# Patient Record
Sex: FEMALE | Race: WHITE | NOT HISPANIC OR LATINO | Employment: OTHER | URBAN - NONMETROPOLITAN AREA
[De-identification: names, ages, dates, MRNs, and addresses within clinical notes are randomized per-mention and may not be internally consistent; named-entity substitution may affect disease eponyms.]

---

## 2018-03-05 ENCOUNTER — TRANSCRIBE ORDERS (OUTPATIENT)
Dept: ADMINISTRATIVE | Facility: HOSPITAL | Age: 73
End: 2018-03-05

## 2018-03-05 DIAGNOSIS — Z12.39 SCREENING BREAST EXAMINATION: Primary | ICD-10-CM

## 2018-03-28 ENCOUNTER — HOSPITAL ENCOUNTER (OUTPATIENT)
Dept: MAMMOGRAPHY | Facility: HOSPITAL | Age: 73
Discharge: HOME | End: 2018-03-28
Admitting: NURSE PRACTITIONER

## 2018-03-28 DIAGNOSIS — Z12.39 SCREENING BREAST EXAMINATION: ICD-10-CM

## 2018-03-28 PROCEDURE — 77063 BREAST TOMOSYNTHESIS BI: CPT

## 2018-03-28 PROCEDURE — 77067 SCR MAMMO BI INCL CAD: CPT

## 2018-03-28 PROCEDURE — 77063 BREAST TOMOSYNTHESIS BI: CPT | Performed by: RADIOLOGY

## 2018-03-28 PROCEDURE — 77067 SCR MAMMO BI INCL CAD: CPT | Performed by: RADIOLOGY

## 2018-08-03 ENCOUNTER — TRANSCRIBE ORDERS (OUTPATIENT)
Dept: ADMINISTRATIVE | Facility: HOSPITAL | Age: 73
End: 2018-08-03

## 2018-08-03 DIAGNOSIS — G44.52 NEW DAILY PERSISTENT HEADACHE: Primary | ICD-10-CM

## 2018-08-07 ENCOUNTER — APPOINTMENT (OUTPATIENT)
Dept: MRI IMAGING | Facility: HOSPITAL | Age: 73
End: 2018-08-07

## 2018-08-11 ENCOUNTER — HOSPITAL ENCOUNTER (OUTPATIENT)
Dept: MRI IMAGING | Facility: HOSPITAL | Age: 73
Discharge: HOME | End: 2018-08-11
Admitting: NURSE PRACTITIONER

## 2018-08-11 DIAGNOSIS — G44.52 NEW DAILY PERSISTENT HEADACHE: ICD-10-CM

## 2018-08-11 PROCEDURE — 70551 MRI BRAIN STEM W/O DYE: CPT | Performed by: RADIOLOGY

## 2018-08-11 PROCEDURE — 70551 MRI BRAIN STEM W/O DYE: CPT

## 2019-06-06 ENCOUNTER — OFFICE VISIT CONVERTED (OUTPATIENT)
Dept: FAMILY MEDICINE CLINIC | Facility: CLINIC | Age: 74
End: 2019-06-06
Attending: NURSE PRACTITIONER

## 2019-06-06 ENCOUNTER — HOSPITAL ENCOUNTER (OUTPATIENT)
Dept: FAMILY MEDICINE CLINIC | Facility: CLINIC | Age: 74
Discharge: HOME | End: 2019-06-06
Attending: NURSE PRACTITIONER

## 2019-06-06 LAB
25(OH)D3 SERPL-MCNC: 20.7 NG/ML (ref 30–100)
ALBUMIN SERPL-MCNC: 4.1 G/DL (ref 3.5–5)
ALBUMIN/GLOB SERPL: 1.4 {RATIO} (ref 1.4–2.6)
ALP SERPL-CCNC: 74 U/L (ref 43–160)
ALT SERPL-CCNC: 17 U/L (ref 10–40)
ANION GAP SERPL CALC-SCNC: 13 MMOL/L (ref 8–19)
AST SERPL-CCNC: 19 U/L (ref 15–50)
BASOPHILS # BLD AUTO: 0.04 10*3/UL (ref 0–0.2)
BASOPHILS NFR BLD AUTO: 0.5 % (ref 0–3)
BILIRUB SERPL-MCNC: 0.5 MG/DL (ref 0.2–1.3)
BUN SERPL-MCNC: 20 MG/DL (ref 5–25)
BUN/CREAT SERPL: 24 {RATIO} (ref 6–20)
CALCIUM SERPL-MCNC: 10 MG/DL (ref 8.7–10.4)
CHLORIDE SERPL-SCNC: 105 MMOL/L (ref 99–111)
CHOLEST SERPL-MCNC: 140 MG/DL (ref 107–200)
CHOLEST/HDLC SERPL: 2.6 {RATIO} (ref 3–6)
CONV ABS IMM GRAN: 0.01 10*3/UL (ref 0–0.2)
CONV CO2: 27 MMOL/L (ref 22–32)
CONV IMMATURE GRAN: 0.1 % (ref 0–1.8)
CONV TOTAL PROTEIN: 7 G/DL (ref 6.3–8.2)
CREAT UR-MCNC: 0.85 MG/DL (ref 0.5–0.9)
DEPRECATED RDW RBC AUTO: 47.7 FL (ref 36.4–46.3)
EOSINOPHIL # BLD AUTO: 0.15 10*3/UL (ref 0–0.7)
EOSINOPHIL # BLD AUTO: 2 % (ref 0–7)
ERYTHROCYTE [DISTWIDTH] IN BLOOD BY AUTOMATED COUNT: 13.3 % (ref 11.7–14.4)
EST. AVERAGE GLUCOSE BLD GHB EST-MCNC: 137 MG/DL
GFR SERPLBLD BASED ON 1.73 SQ M-ARVRAT: >60 ML/MIN/{1.73_M2}
GLOBULIN UR ELPH-MCNC: 2.9 G/DL (ref 2–3.5)
GLUCOSE SERPL-MCNC: 155 MG/DL (ref 65–99)
HBA1C MFR BLD: 14.4 G/DL (ref 12–16)
HBA1C MFR BLD: 6.4 % (ref 3.5–5.7)
HCT VFR BLD AUTO: 45.5 % (ref 37–47)
HDLC SERPL-MCNC: 53 MG/DL (ref 40–60)
LDLC SERPL CALC-MCNC: 57 MG/DL (ref 70–100)
LYMPHOCYTES # BLD AUTO: 1.89 10*3/UL (ref 1–5)
MCH RBC QN AUTO: 30.8 PG (ref 27–31)
MCHC RBC AUTO-ENTMCNC: 31.6 G/DL (ref 33–37)
MCV RBC AUTO: 97.4 FL (ref 81–99)
MONOCYTES # BLD AUTO: 0.52 10*3/UL (ref 0.2–1.2)
MONOCYTES NFR BLD AUTO: 6.9 % (ref 3–10)
NEUTROPHILS # BLD AUTO: 4.91 10*3/UL (ref 2–8)
NEUTROPHILS NFR BLD AUTO: 65.4 % (ref 30–85)
NRBC CBCN: 0 % (ref 0–0.7)
OSMOLALITY SERPL CALC.SUM OF ELEC: 298 MOSM/KG (ref 273–304)
PLATELET # BLD AUTO: 277 10*3/UL (ref 130–400)
PMV BLD AUTO: 10.1 FL (ref 9.4–12.3)
POTASSIUM SERPL-SCNC: 4.4 MMOL/L (ref 3.5–5.3)
RBC # BLD AUTO: 4.67 10*6/UL (ref 4.2–5.4)
SODIUM SERPL-SCNC: 141 MMOL/L (ref 135–147)
T4 FREE SERPL-MCNC: 1.2 NG/DL (ref 0.9–1.8)
TRIGL SERPL-MCNC: 152 MG/DL (ref 40–150)
TSH SERPL-ACNC: 1.43 M[IU]/L (ref 0.27–4.2)
VARIANT LYMPHS NFR BLD MANUAL: 25.1 % (ref 20–45)
VLDLC SERPL-MCNC: 30 MG/DL (ref 5–37)
WBC # BLD AUTO: 7.52 10*3/UL (ref 4.8–10.8)

## 2019-06-10 ENCOUNTER — HOSPITAL ENCOUNTER (OUTPATIENT)
Dept: OTHER | Facility: HOSPITAL | Age: 74
Discharge: HOME | End: 2019-06-10
Attending: NURSE PRACTITIONER

## 2019-06-24 ENCOUNTER — OFFICE VISIT CONVERTED (OUTPATIENT)
Dept: PODIATRY | Facility: CLINIC | Age: 74
End: 2019-06-24
Attending: PODIATRIST

## 2019-07-31 ENCOUNTER — HOSPITAL ENCOUNTER (OUTPATIENT)
Dept: URGENT CARE | Facility: CLINIC | Age: 74
Discharge: HOME | End: 2019-07-31
Attending: FAMILY MEDICINE

## 2019-09-09 ENCOUNTER — HOSPITAL ENCOUNTER (OUTPATIENT)
Dept: FAMILY MEDICINE CLINIC | Facility: CLINIC | Age: 74
Discharge: HOME | End: 2019-09-09
Attending: NURSE PRACTITIONER

## 2019-09-09 ENCOUNTER — CONVERSION ENCOUNTER (OUTPATIENT)
Dept: FAMILY MEDICINE CLINIC | Facility: CLINIC | Age: 74
End: 2019-09-09

## 2019-09-09 ENCOUNTER — OFFICE VISIT CONVERTED (OUTPATIENT)
Dept: FAMILY MEDICINE CLINIC | Facility: CLINIC | Age: 74
End: 2019-09-09
Attending: NURSE PRACTITIONER

## 2019-09-09 LAB — 25(OH)D3 SERPL-MCNC: 29.7 NG/ML (ref 30–100)

## 2019-11-08 ENCOUNTER — HOSPITAL ENCOUNTER (OUTPATIENT)
Dept: MAMMOGRAPHY | Facility: HOSPITAL | Age: 74
Discharge: HOME | End: 2019-11-08
Attending: NURSE PRACTITIONER

## 2020-03-09 ENCOUNTER — CONVERSION ENCOUNTER (OUTPATIENT)
Dept: FAMILY MEDICINE CLINIC | Facility: CLINIC | Age: 75
End: 2020-03-09

## 2020-03-09 ENCOUNTER — OFFICE VISIT CONVERTED (OUTPATIENT)
Dept: FAMILY MEDICINE CLINIC | Facility: CLINIC | Age: 75
End: 2020-03-09
Attending: NURSE PRACTITIONER

## 2020-03-09 ENCOUNTER — HOSPITAL ENCOUNTER (OUTPATIENT)
Dept: FAMILY MEDICINE CLINIC | Facility: CLINIC | Age: 75
Discharge: HOME | End: 2020-03-09
Attending: NURSE PRACTITIONER

## 2020-03-09 LAB
ALBUMIN SERPL-MCNC: 4.3 G/DL (ref 3.5–5)
ALBUMIN/GLOB SERPL: 1.5 {RATIO} (ref 1.4–2.6)
ALP SERPL-CCNC: 82 U/L (ref 43–160)
ALT SERPL-CCNC: 28 U/L (ref 10–40)
ANION GAP SERPL CALC-SCNC: 16 MMOL/L (ref 8–19)
AST SERPL-CCNC: 22 U/L (ref 15–50)
BASOPHILS # BLD AUTO: 0.03 10*3/UL (ref 0–0.2)
BASOPHILS NFR BLD AUTO: 0.5 % (ref 0–3)
BILIRUB SERPL-MCNC: 0.75 MG/DL (ref 0.2–1.3)
BUN SERPL-MCNC: 15 MG/DL (ref 5–25)
BUN/CREAT SERPL: 17 {RATIO} (ref 6–20)
CALCIUM SERPL-MCNC: 10.5 MG/DL (ref 8.7–10.4)
CHLORIDE SERPL-SCNC: 103 MMOL/L (ref 99–111)
CHOLEST SERPL-MCNC: 140 MG/DL (ref 107–200)
CHOLEST/HDLC SERPL: 2.9 {RATIO} (ref 3–6)
CONV ABS IMM GRAN: 0.02 10*3/UL (ref 0–0.2)
CONV CO2: 25 MMOL/L (ref 22–32)
CONV IMMATURE GRAN: 0.3 % (ref 0–1.8)
CONV TOTAL PROTEIN: 7.1 G/DL (ref 6.3–8.2)
CREAT UR-MCNC: 0.89 MG/DL (ref 0.5–0.9)
DEPRECATED RDW RBC AUTO: 45.3 FL (ref 36.4–46.3)
EOSINOPHIL # BLD AUTO: 0.14 10*3/UL (ref 0–0.7)
EOSINOPHIL # BLD AUTO: 2.1 % (ref 0–7)
ERYTHROCYTE [DISTWIDTH] IN BLOOD BY AUTOMATED COUNT: 13 % (ref 11.7–14.4)
EST. AVERAGE GLUCOSE BLD GHB EST-MCNC: 189 MG/DL
GFR SERPLBLD BASED ON 1.73 SQ M-ARVRAT: >60 ML/MIN/{1.73_M2}
GLOBULIN UR ELPH-MCNC: 2.8 G/DL (ref 2–3.5)
GLUCOSE SERPL-MCNC: 171 MG/DL (ref 65–99)
HBA1C MFR BLD: 8.2 % (ref 3.5–5.7)
HCT VFR BLD AUTO: 45.4 % (ref 37–47)
HDLC SERPL-MCNC: 49 MG/DL (ref 40–60)
HGB BLD-MCNC: 14.6 G/DL (ref 12–16)
LDLC SERPL CALC-MCNC: 66 MG/DL (ref 70–100)
LYMPHOCYTES # BLD AUTO: 1.82 10*3/UL (ref 1–5)
LYMPHOCYTES NFR BLD AUTO: 27.4 % (ref 20–45)
MCH RBC QN AUTO: 30.4 PG (ref 27–31)
MCHC RBC AUTO-ENTMCNC: 32.2 G/DL (ref 33–37)
MCV RBC AUTO: 94.6 FL (ref 81–99)
MONOCYTES # BLD AUTO: 0.45 10*3/UL (ref 0.2–1.2)
MONOCYTES NFR BLD AUTO: 6.8 % (ref 3–10)
NEUTROPHILS # BLD AUTO: 4.19 10*3/UL (ref 2–8)
NEUTROPHILS NFR BLD AUTO: 62.9 % (ref 30–85)
NRBC CBCN: 0 % (ref 0–0.7)
OSMOLALITY SERPL CALC.SUM OF ELEC: 293 MOSM/KG (ref 273–304)
PLATELET # BLD AUTO: 287 10*3/UL (ref 130–400)
PMV BLD AUTO: 10.3 FL (ref 9.4–12.3)
POTASSIUM SERPL-SCNC: 4.6 MMOL/L (ref 3.5–5.3)
RBC # BLD AUTO: 4.8 10*6/UL (ref 4.2–5.4)
SODIUM SERPL-SCNC: 139 MMOL/L (ref 135–147)
T4 FREE SERPL-MCNC: 1.1 NG/DL (ref 0.9–1.8)
TRIGL SERPL-MCNC: 124 MG/DL (ref 40–150)
TSH SERPL-ACNC: 1.82 M[IU]/L (ref 0.27–4.2)
VLDLC SERPL-MCNC: 25 MG/DL (ref 5–37)
WBC # BLD AUTO: 6.65 10*3/UL (ref 4.8–10.8)

## 2020-03-10 LAB — 25(OH)D3 SERPL-MCNC: 29.7 NG/ML (ref 30–100)

## 2020-06-10 ENCOUNTER — CONVERSION ENCOUNTER (OUTPATIENT)
Dept: FAMILY MEDICINE CLINIC | Facility: CLINIC | Age: 75
End: 2020-06-10

## 2020-06-10 ENCOUNTER — OFFICE VISIT CONVERTED (OUTPATIENT)
Dept: FAMILY MEDICINE CLINIC | Facility: CLINIC | Age: 75
End: 2020-06-10
Attending: NURSE PRACTITIONER

## 2020-06-10 ENCOUNTER — HOSPITAL ENCOUNTER (OUTPATIENT)
Dept: FAMILY MEDICINE CLINIC | Facility: CLINIC | Age: 75
Discharge: HOME | End: 2020-06-10
Attending: NURSE PRACTITIONER

## 2020-06-10 LAB
ALBUMIN SERPL-MCNC: 4.2 G/DL (ref 3.5–5)
ALBUMIN/GLOB SERPL: 1.3 {RATIO} (ref 1.4–2.6)
ALP SERPL-CCNC: 77 U/L (ref 43–160)
ALT SERPL-CCNC: 18 U/L (ref 10–40)
ANION GAP SERPL CALC-SCNC: 14 MMOL/L (ref 8–19)
AST SERPL-CCNC: 20 U/L (ref 15–50)
BILIRUB SERPL-MCNC: 0.58 MG/DL (ref 0.2–1.3)
BUN SERPL-MCNC: 13 MG/DL (ref 5–25)
BUN/CREAT SERPL: 16 {RATIO} (ref 6–20)
CALCIUM SERPL-MCNC: 9.8 MG/DL (ref 8.7–10.4)
CHLORIDE SERPL-SCNC: 109 MMOL/L (ref 99–111)
CHOLEST SERPL-MCNC: 125 MG/DL (ref 107–200)
CHOLEST/HDLC SERPL: 2.7 {RATIO} (ref 3–6)
CONV CO2: 22 MMOL/L (ref 22–32)
CONV TOTAL PROTEIN: 7.4 G/DL (ref 6.3–8.2)
CREAT UR-MCNC: 0.83 MG/DL (ref 0.5–0.9)
EST. AVERAGE GLUCOSE BLD GHB EST-MCNC: 160 MG/DL
GFR SERPLBLD BASED ON 1.73 SQ M-ARVRAT: >60 ML/MIN/{1.73_M2}
GLOBULIN UR ELPH-MCNC: 3.2 G/DL (ref 2–3.5)
GLUCOSE SERPL-MCNC: 87 MG/DL (ref 65–99)
HBA1C MFR BLD: 7.2 % (ref 3.5–5.7)
HDLC SERPL-MCNC: 47 MG/DL (ref 40–60)
LDLC SERPL CALC-MCNC: 54 MG/DL (ref 70–100)
OSMOLALITY SERPL CALC.SUM OF ELEC: 291 MOSM/KG (ref 273–304)
POTASSIUM SERPL-SCNC: 4 MMOL/L (ref 3.5–5.3)
SODIUM SERPL-SCNC: 141 MMOL/L (ref 135–147)
TRIGL SERPL-MCNC: 119 MG/DL (ref 40–150)
VLDLC SERPL-MCNC: 24 MG/DL (ref 5–37)

## 2020-09-10 ENCOUNTER — OFFICE VISIT CONVERTED (OUTPATIENT)
Dept: FAMILY MEDICINE CLINIC | Facility: CLINIC | Age: 75
End: 2020-09-10
Attending: NURSE PRACTITIONER

## 2020-09-16 ENCOUNTER — HOSPITAL ENCOUNTER (OUTPATIENT)
Dept: OTHER | Facility: HOSPITAL | Age: 75
Discharge: HOME | End: 2020-09-16
Attending: NURSE PRACTITIONER

## 2020-09-16 LAB
25(OH)D3 SERPL-MCNC: 27.6 NG/ML (ref 30–100)
ALBUMIN SERPL-MCNC: 3.9 G/DL (ref 3.5–5)
ALBUMIN/GLOB SERPL: 1.3 {RATIO} (ref 1.4–2.6)
ALP SERPL-CCNC: 78 U/L (ref 43–160)
ALT SERPL-CCNC: 31 U/L (ref 10–40)
ANION GAP SERPL CALC-SCNC: 15 MMOL/L (ref 8–19)
AST SERPL-CCNC: 25 U/L (ref 15–50)
BILIRUB SERPL-MCNC: 0.69 MG/DL (ref 0.2–1.3)
BUN SERPL-MCNC: 19 MG/DL (ref 5–25)
BUN/CREAT SERPL: 18 {RATIO} (ref 6–20)
CALCIUM SERPL-MCNC: 10.2 MG/DL (ref 8.7–10.4)
CHLORIDE SERPL-SCNC: 108 MMOL/L (ref 99–111)
CHOLEST SERPL-MCNC: 142 MG/DL (ref 107–200)
CHOLEST/HDLC SERPL: 3 {RATIO} (ref 3–6)
CONV CO2: 25 MMOL/L (ref 22–32)
CONV CREATININE URINE, RANDOM: 49.7 MG/DL (ref 10–300)
CONV MICROALBUM.,U,RANDOM: <12 MG/L (ref 0–20)
CONV TOTAL PROTEIN: 6.9 G/DL (ref 6.3–8.2)
CREAT UR-MCNC: 1.03 MG/DL (ref 0.5–0.9)
EST. AVERAGE GLUCOSE BLD GHB EST-MCNC: 143 MG/DL
GFR SERPLBLD BASED ON 1.73 SQ M-ARVRAT: 53 ML/MIN/{1.73_M2}
GLOBULIN UR ELPH-MCNC: 3 G/DL (ref 2–3.5)
GLUCOSE SERPL-MCNC: 189 MG/DL (ref 65–99)
HBA1C MFR BLD: 6.6 % (ref 3.5–5.7)
HDLC SERPL-MCNC: 48 MG/DL (ref 40–60)
LDLC SERPL CALC-MCNC: 65 MG/DL (ref 70–100)
MICROALBUMIN/CREAT UR: 24.1 MG/G{CRE} (ref 0–35)
OSMOLALITY SERPL CALC.SUM OF ELEC: 303 MOSM/KG (ref 273–304)
POTASSIUM SERPL-SCNC: 4.9 MMOL/L (ref 3.5–5.3)
SODIUM SERPL-SCNC: 143 MMOL/L (ref 135–147)
T4 FREE SERPL-MCNC: 1.1 NG/DL (ref 0.9–1.8)
TRIGL SERPL-MCNC: 145 MG/DL (ref 40–150)
TSH SERPL-ACNC: 2.05 M[IU]/L (ref 0.27–4.2)
VLDLC SERPL-MCNC: 29 MG/DL (ref 5–37)

## 2020-11-12 ENCOUNTER — HOSPITAL ENCOUNTER (OUTPATIENT)
Dept: PREADMISSION TESTING | Facility: HOSPITAL | Age: 75
Discharge: HOME | End: 2020-11-12
Attending: OPHTHALMOLOGY

## 2020-11-14 LAB — SARS-COV-2 RNA SPEC QL NAA+PROBE: NOT DETECTED

## 2020-11-17 ENCOUNTER — HOSPITAL ENCOUNTER (OUTPATIENT)
Dept: PERIOP | Facility: HOSPITAL | Age: 75
Setting detail: HOSPITAL OUTPATIENT SURGERY
Discharge: HOME | End: 2020-11-17
Attending: OPHTHALMOLOGY

## 2020-11-17 LAB — GLUCOSE BLD-MCNC: 165 MG/DL (ref 65–99)

## 2020-12-09 ENCOUNTER — HOSPITAL ENCOUNTER (OUTPATIENT)
Dept: MAMMOGRAPHY | Facility: HOSPITAL | Age: 75
Discharge: HOME | End: 2020-12-09
Attending: NURSE PRACTITIONER

## 2021-02-17 DIAGNOSIS — Z23 IMMUNIZATION DUE: ICD-10-CM

## 2021-03-10 ENCOUNTER — OFFICE VISIT CONVERTED (OUTPATIENT)
Dept: FAMILY MEDICINE CLINIC | Facility: CLINIC | Age: 76
End: 2021-03-10
Attending: NURSE PRACTITIONER

## 2021-03-10 ENCOUNTER — HOSPITAL ENCOUNTER (OUTPATIENT)
Dept: FAMILY MEDICINE CLINIC | Facility: CLINIC | Age: 76
Discharge: HOME | End: 2021-03-10
Attending: NURSE PRACTITIONER

## 2021-03-10 LAB
BASOPHILS # BLD AUTO: 0.04 10*3/UL (ref 0–0.2)
BASOPHILS NFR BLD AUTO: 0.6 % (ref 0–3)
CONV ABS IMM GRAN: 0.02 10*3/UL (ref 0–0.2)
CONV IMMATURE GRAN: 0.3 % (ref 0–1.8)
DEPRECATED RDW RBC AUTO: 45 FL (ref 36.4–46.3)
EOSINOPHIL # BLD AUTO: 0.1 10*3/UL (ref 0–0.7)
EOSINOPHIL # BLD AUTO: 1.6 % (ref 0–7)
ERYTHROCYTE [DISTWIDTH] IN BLOOD BY AUTOMATED COUNT: 13.1 % (ref 11.7–14.4)
HCT VFR BLD AUTO: 50.1 % (ref 37–47)
HGB BLD-MCNC: 16 G/DL (ref 12–16)
LYMPHOCYTES # BLD AUTO: 1.84 10*3/UL (ref 1–5)
LYMPHOCYTES NFR BLD AUTO: 28.6 % (ref 20–45)
MCH RBC QN AUTO: 30 PG (ref 27–31)
MCHC RBC AUTO-ENTMCNC: 31.9 G/DL (ref 33–37)
MCV RBC AUTO: 93.8 FL (ref 81–99)
MONOCYTES # BLD AUTO: 0.52 10*3/UL (ref 0.2–1.2)
MONOCYTES NFR BLD AUTO: 8.1 % (ref 3–10)
NEUTROPHILS # BLD AUTO: 3.91 10*3/UL (ref 2–8)
NEUTROPHILS NFR BLD AUTO: 60.8 % (ref 30–85)
NRBC CBCN: 0 % (ref 0–0.7)
PLATELET # BLD AUTO: 281 10*3/UL (ref 130–400)
PMV BLD AUTO: 10.1 FL (ref 9.4–12.3)
RBC # BLD AUTO: 5.34 10*6/UL (ref 4.2–5.4)
WBC # BLD AUTO: 6.43 10*3/UL (ref 4.8–10.8)

## 2021-03-11 LAB
25(OH)D3 SERPL-MCNC: 43.8 NG/ML (ref 30–100)
ALBUMIN SERPL-MCNC: 4.1 G/DL (ref 3.5–5)
ALBUMIN/GLOB SERPL: 1.4 {RATIO} (ref 1.4–2.6)
ALP SERPL-CCNC: 76 U/L (ref 43–160)
ALT SERPL-CCNC: 20 U/L (ref 10–40)
ANION GAP SERPL CALC-SCNC: 15 MMOL/L (ref 8–19)
AST SERPL-CCNC: 22 U/L (ref 15–50)
BILIRUB SERPL-MCNC: 0.53 MG/DL (ref 0.2–1.3)
BUN SERPL-MCNC: 20 MG/DL (ref 5–25)
BUN/CREAT SERPL: 22 {RATIO} (ref 6–20)
CALCIUM SERPL-MCNC: 9.8 MG/DL (ref 8.7–10.4)
CHLORIDE SERPL-SCNC: 106 MMOL/L (ref 99–111)
CHOLEST SERPL-MCNC: 209 MG/DL (ref 107–200)
CHOLEST/HDLC SERPL: 4.2 {RATIO} (ref 3–6)
CONV CO2: 24 MMOL/L (ref 22–32)
CONV TOTAL PROTEIN: 7 G/DL (ref 6.3–8.2)
CREAT UR-MCNC: 0.91 MG/DL (ref 0.5–0.9)
EST. AVERAGE GLUCOSE BLD GHB EST-MCNC: 160 MG/DL
GFR SERPLBLD BASED ON 1.73 SQ M-ARVRAT: >60 ML/MIN/{1.73_M2}
GLOBULIN UR ELPH-MCNC: 2.9 G/DL (ref 2–3.5)
GLUCOSE SERPL-MCNC: 134 MG/DL (ref 65–99)
HBA1C MFR BLD: 7.2 % (ref 3.5–5.7)
HDLC SERPL-MCNC: 50 MG/DL (ref 40–60)
LDLC SERPL CALC-MCNC: 125 MG/DL (ref 70–100)
OSMOLALITY SERPL CALC.SUM OF ELEC: 295 MOSM/KG (ref 273–304)
POTASSIUM SERPL-SCNC: 4.7 MMOL/L (ref 3.5–5.3)
SODIUM SERPL-SCNC: 140 MMOL/L (ref 135–147)
T4 FREE SERPL-MCNC: 1.3 NG/DL (ref 0.9–1.8)
TRIGL SERPL-MCNC: 169 MG/DL (ref 40–150)
TSH SERPL-ACNC: 0.6 M[IU]/L (ref 0.27–4.2)
VLDLC SERPL-MCNC: 34 MG/DL (ref 5–37)

## 2021-05-13 NOTE — PROGRESS NOTES
Progress Note      Patient Name: Krissy Castillo   Patient ID: [id number]   Sex: Female   YOB: 1945    Primary Care Provider: Monica CARRILLO   Referring Provider: Monica CARRILLO    Visit Date: Yuridia 10, 2020    Provider: LORENA Darden   [place] [facility] [place]   Location Address: [address] [place] [address]  [id number]   Location Phone: [phone]          Chief Complaint  · 3 Month Follow up for Diabetes, HTN, Hyperlipidemia, Hypothyroidism, Vitamin D Deficiency, and Vitamin B12 deficiency      History Of Present Illness  Krissy Castillo is a 75 year old /White female who presents for evaluation and treatment of:      3 Month Follow up for Diabetes, HTN, Hyperlipidemia, Hypothyroidism, Vitamin D Deficiency, and Vitamin B12 deficiency  Last lab work done 3/2020 for Vitamin D level. Due to check Diabetes 1 Panel (Orders previously placed in chart)  Med refills needed    Pt reported the vaginal itching had improved after taking Diflucan, but will have itching off and on still since starting on Jardiance.    DM - pt hasn't been able to check bs, lost meter.     HTN - b/p elevated today.    Migraine - 2 since March, relief with Maxalt.    flu shot- deferred  Pneu-PCV13 3/2020 and P23-UKN  Colon-2016  Mammo-11/8/19       Past Medical History  Disease Name Date Onset Notes   Arthritis --  --    Constipated --  --    Diabetes mellitus type II, controlled --  --    Diverticular disease of colon --  --    Gastric ulcer --  --    Glaucoma --  --    Heel pain --  --    Hyperlipidemia --  --    Hypertension --  --    Hypothyroidism --  --    Migraine with aura --  --    Osteoporosis --  --    Screening Mammogram 11/8/19 --    Spinal stenosis --  --    Vitamin B12 Deficiency --  --    Vitamin D Deficiency --  --          Past Surgical History  Procedure Name Date Notes   Adenoidectomy --  --    Cholecstectomy --  --    Colonoscopy 2016 [facility]  [place]   D&C --  --    Hysterectomy --  --    Tonsilectomy --  --          Medication List  Name Date Started Instructions   atenolol 50 mg oral tablet 03/09/2020 TAKE 1 TABLET BY MOUTH EVERY DAY   atorvastatin 20 mg oral tablet 03/09/2020 take 1 tablet (20 mg) by oral route once daily at bedtime for 90 days   Caltrate 600 + D 600 mg (1,500 mg)-800 unit oral tablet,chewable  --    cetirizine 10 mg oral tablet 03/09/2020 take 1 tablet (10 mg) by oral route once daily   FiberCon 625 mg oral tablet  --    fluticasone propionate 50 mcg/actuation nasal spray,suspension 12/03/2019 spray 1 - 2 sprays in each nostril by intranasal route once daily   glipizide 5 mg oral tablet 03/09/2020 TAKE 1 TABLET(5 MG) BY MOUTH TWICE DAILY BEFORE MEALS   ibuprofen 600 mg oral tablet  take 1 tablet (600 mg) by oral route 3 times per day with food   Jardiance 10 mg oral tablet 03/10/2020 take 1 tablet (10 mg) by oral route once daily in the morning for 30 days   levothyroxine 50 mcg oral tablet 03/09/2020 TAKE 1 TABLET BY MOUTH EVERY DAY   losartan 100 mg oral tablet 03/09/2020 TAKE 1 TABLET BY MOUTH EVERY DAY   Maxalt 10 mg oral tablet 03/09/2020 take 1 tablet (10 mg) by oral route once, may repeat at 2 hour intervals; do not exceed 30 mg in 24 hours for 30 days   meclizine 12.5 mg oral tablet  take 2 tablets (25 mg) by oral route 2 times per day as needed   vitamin B12-folic acid 500-400 mcg oral tablet  --    Vitamin D3 50 mcg (2,000 unit) oral capsule 03/09/2020 take 1 capsule by oral route daily for 90 days   Voltaren 1 % topical gel 03/09/2020 apply 2 gram to the affected area(s) by topical route 4 times per day         Allergy List  Allergen Name Date Reaction Notes   NO KNOWN DRUG ALLERGIES --  --  --          Family Medical History  Disease Name Relative/Age Notes   Breast Neoplasm, Malignant Mother/   --    Stomach Neoplasm, Malignant  --    Stroke Mother/  Sister/   --    Heart Disease  "Aunt/  Brother/  Father/  Mother/  Sister/  Uncle/   --    Diabetes  --          Social History  Finding Status Start/Stop Quantity Notes   Alcohol Never --/-- --  --    Tobacco Never --/-- --  --          Immunizations  NameDate Admin Mfg Trade Name Lot Number Route Inj VIS Given VIS Publication   [id number] NE Not Entered  NE NE 06/06/2019    Comments:    Prevnar 1303/09/2020 WAL PREVNAR 13 [id number] LD 03/09/2020    Comments: Injection given. pt tolerated well. NDC 7205-2544-35         Review of Systems  · Constitutional  o Denies  o : fatigue, fever, weight gain, weight loss, chills  · Cardiovascular  o Denies  o : chest Pain, palpitations, edema (swelling)  · Respiratory  o Denies  o : frequent cough, shortness of breath  · Gastrointestinal  o Denies  o : nausea, vomiting, changes in bowel habits  · Genitourinary  o Admits  o : vaginal discharge  o Denies  o : dysuria, urinary frequency, urinary urgency, polyuria  · Neurologic  o Denies  o : headache, tingling or numbness, dizziness  · Endocrine  o Denies  o : polydipsia, polyphagia  · Psychiatric  o Denies  o : mood changes, memory changes, SI/HI      Vitals  Date Time BP Position Site L\R Cuff Size HR RR TEMP (F) WT  HT  BMI kg/m2 BSA m2 O2 Sat HC       03/09/2020 03:17 /65 Sitting    85 - R 12  133lbs 8oz 5'  1\" 25.22 1.61 95 %    06/10/2020 02:54 /47 Sitting    73 - R 18 98.5 126lbs 16oz 5'  1\" 24 1.57 96 %    06/10/2020 03:22 /64 Sitting                     Physical Examination  · Constitutional  o Appearance  o : well-nourished, in no acute distress  · Eyes  o Conjunctivae  o : conjunctivae normal  o Sclerae  o : sclerae white  o Pupils and Irises  o : pupils equal and round  o Eyelids/Ocular Adnexae  o : eyelid appearance normal, no exudates present  · Neck  o Inspection/Palpation  o : normal appearance, no masses or tenderness, trachea midline  o Range of Motion  o : cervical range of motion within normal limits  o Thyroid  o : " gland size normal, nontender, no nodules or masses present on palpation  · Respiratory  o Respiratory Effort  o : breathing unlabored  o Inspection of Chest  o : normal appearance  o Auscultation of Lungs  o : normal breath sounds throughout inspiration and expiration  · Cardiovascular  o Heart  o :   § Auscultation of Heart  § : regular rate and rhythm, no murmurs, gallops or rubs  o Peripheral Vascular System  o :   § Carotid Arteries  § : normal pulses bilaterally, no bruits present  § Extremities  § : no clubbing or edema  · Gastrointestinal  o Abdominal Examination  o : abdomen nontender to palpation, tone normal without rigidity or guarding, no masses present, bowel sounds present  · Skin and Subcutaneous Tissue  o General Inspection  o : no rashes or lesions present, no areas of discoloration  o Body Hair  o : hair normal for age, general body hair distribution normal for age  o Digits and Nails  o : no clubbing, cyanosis, deformities or edema present, normal appearing nails  · Neurologic  o Mental Status Examination  o :   § Orientation  § : grossly oriented to person, place and time  o Gait and Station  o : normal gait, able to stand without difficulty  · Psychiatric  o Judgement and Insight  o : judgment and insight intact  o Mood and Affect  o : mood normal, affect appropriate          Assessment  · Type 2 diabetes mellitus without complication, without long-term current use of insulin     250.00/E11.9  · Essential hypertension     401.9/I10  increase atenolol to bid and monitor hr call if lower than 60.  · Hyperlipidemia     272.4/E78.5  · Hypothyroidism     244.9/E03.9  · Vitamin D deficiency     268.9/E55.9  · Vitamin B12 deficiency     266.2/E53.8  · Vaginitis     616.10/N76.0    Problems Reconciled  Plan  · Orders  o ACO-39: Current medications updated and reviewed () - - 06/10/2020  · Medications  o Diflucan 150 mg oral tablet   SIG: take 1 tablet by oral route every 3 days as needed for 9 days    DISP: (3) tablets with 0 refills  Prescribed on 06/10/2020     o atenolol 50 mg oral tablet   SIG: take 1 tablet (50 mg) by oral route 2 times per day for 90 days   DISP: (180) Tablet with 1 refills  Adjusted on 06/10/2020     o Jardiance 10 mg oral tablet   SIG: take 1 tablet (10 mg) by oral route once daily in the morning for 90 days   DISP: (90) tablets with 1 refills  Adjusted on 06/10/2020     o Medications have been Reconciled  o Transition of Care or Provider Policy  · Instructions  o Continue blood sugar monitoring daily and record. Bring your log to office visits. Call the office for readings below 70 and above 250 or any complications.  o Daily foot care. Avoid walking barefoot. Annual Dilated Eye Exam.  o Discussed with patient blood pressure monitoring, hemoglobin A1C levels need to be below 7.0, and LDL (Lipid) goals below 70.  o Patient advised to monitor blood pressure (B/P) at home and journal readings. Patient informed that a B/P reading at home of more than 130/80 is considered hypertension. For readings greater jaas727/90 or higher patient is advised to follow up in the office with readings for management. Patient advised to limit sodium intake.  o Advised that cheeses and other sources of dairy fats, animal fats, fast food, and the extras (candy, pastries, pies, doughnuts and cookies) all contain LDL raising nutrients. Advised to increase fruits, vegetables, whole grains, and to monitor portion sizes.   o Patient was educated/instructed on their diagnosis, treatment and medications prior to discharge from the clinic today.  o Call the office with any concerns or questions.  · Disposition  o Return to Office in 3 months.            Electronically Signed by: LORENA Darden -Author on Yuridia 10, 2020 03:28:30 PM

## 2021-05-13 NOTE — PROGRESS NOTES
Progress Note      Patient Name: Krissy Castillo   Patient ID: [id number]   Sex: Female   YOB: 1945    Primary Care Provider: Monica CARRILLO   Referring Provider: Monica CARRILLO    Visit Date: September 10, 2020    Provider: LORENA Darden   Location: [facility]   Location Address: [address] [place] [address]  [id number]   Location Phone: [phone]          Chief Complaint  · 3 Month Follow up for Diabetes, HTN, Hyperlipidemia, Hypothyroidism, Vitamin D Deficiency, and Vitamin B12 deficiency      History Of Present Illness  Krissy Castillo is a 75 year old /White female who presents for evaluation and treatment of:      3 Month Follow up for Diabetes, HTN, Hyperlipidemia, Hypothyroidism, Vitamin D Deficiency, and Vitamin B12 deficiency.    Last lab work done 6/10/20.  Med Refills needed    DM - 147 this past Friday. pt reports doing well with medications.     HTN - elevated at office. pt reports b/p wnl at home. pt reports compliance with meds.     Hypothyroid - pt reports compliance with meds.             Past Medical History  Disease Name Date Onset Notes   Arthritis --  --    Constipated --  --    Diabetes mellitus type II, controlled --  --    Diverticular disease of colon --  --    Gastric ulcer --  --    Glaucoma --  --    Heel pain --  --    Hyperlipidemia --  --    Hypertension --  --    Hypothyroidism --  --    Migraine with aura --  --    Osteoporosis --  --    Screening Mammogram 11/8/19 --    Spinal stenosis --  --    Vitamin B12 Deficiency --  --    Vitamin D Deficiency --  --          Past Surgical History  Procedure Name Date Notes   Adenoidectomy --  --    Cholecstectomy --  --    Colonoscopy 2016 [facility]   D&C --  --    Hysterectomy --  --    Tonsilectomy --  --          Medication List  Name Date Started Instructions   atenolol 50 mg oral tablet 06/10/2020 take 1 tablet (50 mg) by  oral route 2 times per day for 90 days   atorvastatin 20 mg oral tablet 03/09/2020 take 1 tablet (20 mg) by oral route once daily at bedtime for 90 days   Blood Glucose Test miscellaneous strip  (OneTouch Verio Test Strips) use as directed   Caltrate 600 + D 600 mg (1,500 mg)-800 unit oral tablet,chewable  --    cetirizine 10 mg oral tablet 03/09/2020 take 1 tablet (10 mg) by oral route once daily   Diflucan 150 mg oral tablet 06/10/2020 take 1 tablet by oral route every 3 days as needed for 9 days   FiberCon 625 mg oral tablet  --    fluticasone propionate 50 mcg/actuation nasal spray,suspension 12/03/2019 spray 1 - 2 sprays in each nostril by intranasal route once daily   glipizide 5 mg oral tablet 03/09/2020 TAKE 1 TABLET(5 MG) BY MOUTH TWICE DAILY BEFORE MEALS   ibuprofen 600 mg oral tablet  take 1 tablet (600 mg) by oral route 3 times per day with food   Jardiance 10 mg oral tablet 06/10/2020 take 1 tablet (10 mg) by oral route once daily in the morning for 90 days   levothyroxine 50 mcg oral tablet 03/09/2020 TAKE 1 TABLET BY MOUTH EVERY DAY   losartan 100 mg oral tablet 03/09/2020 TAKE 1 TABLET BY MOUTH EVERY DAY   Maxalt 10 mg oral tablet 03/09/2020 take 1 tablet (10 mg) by oral route once, may repeat at 2 hour intervals; do not exceed 30 mg in 24 hours for 30 days   meclizine 12.5 mg oral tablet  take 2 tablets (25 mg) by oral route 2 times per day as needed   OneTouch Verio Flex miscellaneous misc  use as directed   vitamin B12-folic acid 500-400 mcg oral tablet  --    Vitamin D3 50 mcg (2,000 unit) oral capsule 03/09/2020 take 1 capsule by oral route daily for 90 days   Voltaren 1 % topical gel 03/09/2020 apply 2 gram to the affected area(s) by topical route 4 times per day         Allergy List  Allergen Name Date Reaction Notes   NO KNOWN DRUG ALLERGIES --  --  --          Family Medical History  Disease Name Relative/Age Notes   Breast Neoplasm, Malignant Mother/   --    Stomach Neoplasm, Malignant  --   "  Stroke Mother/  Sister/   --    Heart Disease Aunt/  Brother/  Father/  Mother/  Sister/  Uncle/   --    Diabetes  --          Social History  Finding Status Start/Stop Quantity Notes   Alcohol Never --/-- --  --    Tobacco Never --/-- --  --          Immunizations  NameDate Admin Mfg Trade Name Lot Number Route Inj VIS Given VIS Publication   [id number] NE Not Entered  NE NE 06/06/2019    Comments:    Prevnar 1303/09/2020 WAL PREVNAR 13 [id number] LD 03/09/2020    Comments: Injection given. pt tolerated well. NDC 1487-7479-94         Review of Systems  · Constitutional  o Denies  o : fatigue, fever, weight gain, weight loss, chills  · Cardiovascular  o Denies  o : chest Pain, palpitations, edema (swelling)  · Respiratory  o Denies  o : frequent cough, shortness of breath  · Gastrointestinal  o Denies  o : nausea, vomiting, changes in bowel habits  · Genitourinary  o Denies  o : dysuria, urinary frequency, urinary urgency, polyuria  · Neurologic  o Denies  o : headache, tingling or numbness, dizziness  · Endocrine  o Denies  o : polydipsia, polyphagia  · Psychiatric  o Denies  o : mood changes, memory changes, SI/HI      Vitals  Date Time BP Position Site L\R Cuff Size HR RR TEMP (F) WT  HT  BMI kg/m2 BSA m2 O2 Sat        03/09/2020 03:17 /65 Sitting    85 - R 12  133lbs 8oz 5'  1\" 25.22 1.61 95 %    06/10/2020 02:54 /47 Sitting    73 - R 18 98.5 126lbs 16oz 5'  1\" 24 1.57 96 %    09/10/2020 02:39 /58 Sitting    67 - R 12 98.1 123lbs 8oz 5'  1\" 23.33 1.55 96 %          Physical Examination  · Constitutional  o Appearance  o : well-nourished, in no acute distress  · Eyes  o Conjunctivae  o : conjunctivae normal  o Sclerae  o : sclerae white  o Pupils and Irises  o : pupils equal and round  o Eyelids/Ocular Adnexae  o : eyelid appearance normal, no exudates present  · Neck  o Inspection/Palpation  o : normal appearance, no masses or tenderness, trachea midline  o Range of Motion  o : " cervical range of motion within normal limits  o Thyroid  o : gland size normal, nontender, no nodules or masses present on palpation  · Respiratory  o Respiratory Effort  o : breathing unlabored  o Inspection of Chest  o : normal appearance  o Auscultation of Lungs  o : normal breath sounds throughout inspiration and expiration  · Cardiovascular  o Heart  o :   § Auscultation of Heart  § : regular rate and rhythm, no murmurs, gallops or rubs  o Peripheral Vascular System  o :   § Carotid Arteries  § : normal pulses bilaterally, no bruits present  § Extremities  § : no clubbing or edema  · Gastrointestinal  o Abdominal Examination  o : abdomen nontender to palpation, tone normal without rigidity or guarding, no masses present, bowel sounds present  · Skin and Subcutaneous Tissue  o General Inspection  o : no rashes or lesions present, no areas of discoloration  o Body Hair  o : hair normal for age, general body hair distribution normal for age  o Digits and Nails  o : no clubbing, cyanosis, deformities or edema present, normal appearing nails  · Neurologic  o Mental Status Examination  o :   § Orientation  § : grossly oriented to person, place and time  o Gait and Station  o : normal gait, able to stand without difficulty  · Psychiatric  o Judgement and Insight  o : judgment and insight intact  o Mood and Affect  o : mood normal, affect appropriate          Assessment  · Screening for depression     V79.0/Z13.89  · Visit for screening mammogram     V76.12/Z12.31  · Type 2 diabetes mellitus with hyperglycemia, without long-term current use of insulin       Type 2 diabetes mellitus with hyperglycemia     250.00/E11.65  · Essential hypertension     401.9/I10  · Hyperlipidemia     272.4/E78.5  · Hypothyroidism     244.9/E03.9  · Vitamin D deficiency     268.9/E55.9  · Vitamin B 12 deficiency     266.2/E53.8      Plan  · Orders  o Screening Mammography; Bilateral 3D (62022, , 79726) - V76.12/Z12.31 -  11/16/2020  o Diabetes 2 Panel (Urine Microalbumin, CMP, Lipid, A1c, ) [facility] (29998, 01538, 71836, 82592) - - 09/10/2020  o Thyroid Profile (07693, 07772, THYII) - 244.9/E03.9 - 09/10/2020  o Vitamin D Level (10367) - 268.9/E55.9 - 09/10/2020  o ACO-39: Current medications updated and reviewed () - - 09/10/2020  o ACO-18: Negative screen for clinical depression using a standardized tool () - - 09/10/2020  · Medications  o atorvastatin 20 mg oral tablet   SIG: take 1 tablet (20 mg) by oral route once daily at bedtime for 90 days   DISP: (90) tablets with 1 refills  Refilled on 09/10/2020     o cetirizine 10 mg oral tablet   SIG: take 1 tablet (10 mg) by oral route once daily   DISP: (90) tablets with 1 refills  Refilled on 09/10/2020     o glipizide 5 mg oral tablet   SIG: TAKE 1 TABLET(5 MG) BY MOUTH TWICE DAILY BEFORE MEALS   DISP: (180) Tablet with 1 refills  Refilled on 09/10/2020     o levothyroxine 50 mcg oral tablet   SIG: TAKE 1 TABLET BY MOUTH EVERY DAY   DISP: (90) Tablet with 1 refills  Refilled on 09/10/2020     o losartan 100 mg oral tablet   SIG: TAKE 1 TABLET BY MOUTH EVERY DAY   DISP: (90) Tablet with 1 refills  Refilled on 09/10/2020     o Vitamin D3 50 mcg (2,000 unit) oral capsule   SIG: take 1 capsule by oral route daily for 90 days   DISP: (90) capsules with 1 refills  Refilled on 09/10/2020     o Voltaren 1 % topical gel   SIG: apply 2 gram to the affected area(s) by topical route 4 times per day   DISP: (1) 100 gm tube with 1 refills  Refilled on 09/10/2020     o Medications have been Reconciled  o Transition of Care or Provider Policy  · Instructions  o Depression Screen completed and scanned into the EMR under the designated folder within the patient's documents.  o Today's PHQ-9 result is __0_  o Continue blood sugar monitoring daily and record. Bring your log to office visits. Call the office for readings below 70 and above 250 or any complications.  o Daily foot care. Avoid walking  barefoot. Annual Dilated Eye Exam.  o Discussed with patient blood pressure monitoring, hemoglobin A1C levels need to be below 7.0, and LDL (Lipid) goals below 70.  o Patient advised to monitor blood pressure (B/P) at home and journal readings. Patient informed that a B/P reading at home of more than 130/80 is considered hypertension. For readings greater nzcy110/90 or higher patient is advised to follow up in the office with readings for management. Patient advised to limit sodium intake.  o Advised that cheeses and other sources of dairy fats, animal fats, fast food, and the extras (candy, pastries, pies, doughnuts and cookies) all contain LDL raising nutrients. Advised to increase fruits, vegetables, whole grains, and to monitor portion sizes.   o Patient was educated/instructed on their diagnosis, treatment and medications prior to discharge from the clinic today.  o Call the office with any concerns or questions.  · Disposition  o Return to Office in 6 months.            Electronically Signed by: LORENA Darden -Author on September 10, 2020 03:00:18 PM

## 2021-05-14 VITALS
OXYGEN SATURATION: 96 % | SYSTOLIC BLOOD PRESSURE: 140 MMHG | HEIGHT: 61 IN | TEMPERATURE: 97.6 F | HEART RATE: 53 BPM | BODY MASS INDEX: 23.6 KG/M2 | RESPIRATION RATE: 12 BRPM | WEIGHT: 125 LBS | DIASTOLIC BLOOD PRESSURE: 92 MMHG

## 2021-05-14 VITALS
SYSTOLIC BLOOD PRESSURE: 153 MMHG | RESPIRATION RATE: 12 BRPM | BODY MASS INDEX: 23.32 KG/M2 | OXYGEN SATURATION: 96 % | HEART RATE: 67 BPM | HEIGHT: 61 IN | TEMPERATURE: 98.1 F | DIASTOLIC BLOOD PRESSURE: 58 MMHG | WEIGHT: 123.5 LBS

## 2021-05-14 NOTE — PROGRESS NOTES
Progress Note      Patient Name: Krissy Castillo   Patient ID: [id number]   Sex: Female   YOB: 1945    Primary Care Provider: Monica CARRILLO   Referring Provider: Monica CARRILLO    Visit Date: March 10, 2021    Provider: LORENA Darden   Location: [facility]   Location Address: [address] [place] [address]  [id number]   Location Phone: [phone]          Chief Complaint  · 6 Month Follow up for Diabetes, HTN, Hyperlipidemia, Hypothyroidism, Vitamin D Deficiency, and Vitamin B12 deficiency      History Of Present Illness  Krissy Castillo is a 75 year old /White female who presents for evaluation and treatment of:      6 Month Follow up for Diabetes, HTN, Hyperlipidemia, Hypothyroidism, Vitamin D Deficiency, and Vitamin B12 deficiency  Last lab work done 9/16/20  Med refills needed    DM - pt reports last bs reading was 135. pt reports compliance with medications. denies any low bs.    Hypothyroidism - reports compliance with meds. energy level is good.     Hyperlipidemia - denies any myalgias.       flu shot-9/2020  Pneu-PCV13 3/2020 and P23 2009  Colon-2016  Mammo-12/9/2020  cataract surgery 10.2020.       Past Medical History  Disease Name Date Onset Notes   Arthritis --  --    Constipated --  --    Diabetes mellitus type II, controlled --  --    Diverticular disease of colon --  --    Gastric ulcer --  --    Glaucoma --  --    Heel pain --  --    Hyperlipidemia --  --    Hypertension --  --    Hypothyroidism --  --    Migraine with aura --  --    Osteoporosis --  --    Screening Mammogram 11/8/19 --    Spinal stenosis --  --    Vitamin B12 Deficiency --  --    Vitamin D Deficiency --  --          Past Surgical History  Procedure Name Date Notes   Adenoidectomy --  --    Cholecstectomy --  --    Colonoscopy 2016 [facility]   D&C --  --    Hysterectomy --  --    Tonsilectomy --  --          Medication  List  Name Date Started Instructions   atenolol 50 mg oral tablet 03/10/2021 take 1 tablet (50 mg) by oral route 2 times per day for 90 days   atorvastatin 20 mg oral tablet 03/10/2021 take 1 tablet (20 mg) by oral route once daily at bedtime for 90 days   Blood Glucose Test miscellaneous strip  (OneTouch Verio Test Strips) use as directed   Caltrate 600 + D 600 mg (1,500 mg)-800 unit oral tablet,chewable  --    cetirizine 10 mg oral tablet 03/10/2021 take 1 tablet (10 mg) by oral route once daily   FiberCon 625 mg oral tablet  --    fluticasone propionate 50 mcg/actuation nasal spray,suspension 12/03/2019 spray 1 - 2 sprays in each nostril by intranasal route once daily   glipizide 5 mg oral tablet 03/10/2021 TAKE 1 TABLET(5 MG) BY MOUTH TWICE DAILY BEFORE MEALS   ibuprofen 600 mg oral tablet  take 1 tablet (600 mg) by oral route 3 times per day with food   Jardiance 10 mg oral tablet 03/10/2021 take 1 tablet (10 mg) by oral route once daily in the morning for 90 days   levothyroxine 50 mcg oral tablet 03/10/2021 TAKE 1 TABLET BY MOUTH EVERY DAY   losartan 100 mg oral tablet 03/10/2021 TAKE 1 TABLET BY MOUTH EVERY DAY   Maxalt 10 mg oral tablet 03/09/2020 take 1 tablet (10 mg) by oral route once, may repeat at 2 hour intervals; do not exceed 30 mg in 24 hours for 30 days   meclizine 12.5 mg oral tablet  take 2 tablets (25 mg) by oral route 2 times per day as needed   OneTouch Verio Flex miscellaneous misc  use as directed   vitamin B12-folic acid 500-400 mcg oral tablet  --    Vitamin D3 50 mcg (2,000 unit) oral capsule 09/10/2020 take 1 capsule by oral route daily for 90 days   Voltaren 1 % topical gel 09/10/2020 apply 2 gram to the affected area(s) by topical route 4 times per day         Allergy List  Allergen Name Date Reaction Notes   NO KNOWN DRUG ALLERGIES --  --  --          Family Medical History  Disease Name Relative/Age Notes   Breast Neoplasm, Malignant Mother/   --    Stomach Neoplasm, Malignant  --   "  Stroke Mother/  Sister/   --    Heart Disease Aunt/  Brother/  Father/  Mother/  Sister/  Uncle/   --    Diabetes  --          Social History  Finding Status Start/Stop Quantity Notes   Alcohol Never --/-- --  --    Tobacco Never --/-- --  --          Immunizations  NameDate Admin Mfg Trade Name Lot Number Route Inj VIS Given VIS Publication   [id number] NE Not Entered  NE NE 06/06/2019    Comments:    Prevnar 1303/09/2020 [facility] PREVNAR 13 [id number] IM LD 03/09/2020    Comments: Injection given. pt tolerated well. NDC 4694-5222-74         Review of Systems  · Constitutional  o Denies  o : fatigue, fever, weight gain, weight loss, chills  · Cardiovascular  o Denies  o : chest Pain, palpitations, edema (swelling)  · Respiratory  o Denies  o : frequent cough, shortness of breath  · Gastrointestinal  o Denies  o : nausea, vomiting, changes in bowel habits  · Genitourinary  o Denies  o : dysuria, urinary frequency, urinary urgency, polyuria  · Neurologic  o Denies  o : headache, tingling or numbness, dizziness  · Musculoskeletal  o Denies  o : joint pain, myalgias  · Psychiatric  o Denies  o : mood changes, memory changes, SI/HI      Vitals  Date Time BP Position Site L\R Cuff Size HR RR TEMP (F) WT  HT  BMI kg/m2 BSA m2 O2 Sat FR L/min FiO2 HC       06/10/2020 02:54 /47 Sitting    73 - R 18 98.5 126lbs 16oz 5'  1\" 24 1.57 96 %      06/10/2020 03:22 /64 Sitting                 09/10/2020 02:39 /58 Sitting    67 - R 12 98.1 123lbs 8oz 5'  1\" 23.33 1.55 96 %      03/10/2021 01:59 /92 Sitting    53 - R 12 97.6 125lbs 0oz 5'  1\" 23.62 1.56 96 %            Physical Examination  · Constitutional  o Appearance  o : well-nourished, in no acute distress  · Eyes  o Conjunctivae  o : conjunctivae normal  o Sclerae  o : sclerae white  o Pupils and Irises  o : pupils equal and round  o Eyelids/Ocular Adnexae  o : eyelid appearance normal, no exudates present  · Neck  o Inspection/Palpation  o : normal " appearance, no masses or tenderness, trachea midline  o Range of Motion  o : cervical range of motion within normal limits  o Thyroid  o : gland size normal, nontender, no nodules or masses present on palpation  · Respiratory  o Respiratory Effort  o : breathing unlabored  o Inspection of Chest  o : normal appearance  o Auscultation of Lungs  o : normal breath sounds throughout inspiration and expiration  · Cardiovascular  o Heart  o :   § Auscultation of Heart  § : regular rate and rhythm, no murmurs, gallops or rubs  o Peripheral Vascular System  o :   § Carotid Arteries  § : normal pulses bilaterally, no bruits present  § Extremities  § : no clubbing or edema  · Gastrointestinal  o Abdominal Examination  o : abdomen nontender to palpation, tone normal without rigidity or guarding, no masses present, bowel sounds present  · Skin and Subcutaneous Tissue  o General Inspection  o : no rashes or lesions present, no areas of discoloration  o Body Hair  o : hair normal for age, general body hair distribution normal for age  o Digits and Nails  o : no clubbing, cyanosis, deformities or edema present, normal appearing nails  · Neurologic  o Mental Status Examination  o :   § Orientation  § : grossly oriented to person, place and time  o Gait and Station  o : normal gait, able to stand without difficulty  · Psychiatric  o Judgement and Insight  o : judgment and insight intact  o Mood and Affect  o : mood normal, affect appropriate          Assessment  · Diabetes mellitus, type 2     250.00/E11.9  · Essential hypertension     401.9/I10  · Hyperlipidemia     272.4/E78.5  · Hypothyroidism     244.9/E03.9  · Vitamin D deficiency     268.9/E55.9  · Vitamin B12 deficiency     266.2/E53.8      Plan  · Orders  o Diabetes 1 Panel (CMP, Lipid, A1c) [facility] (55577, 80011, 33560) - 250.00/E11.9 - 03/10/2021  o CBC with Auto Diff [facility] (91758) - 250.00/E11.9 - 03/10/2021  o Thyroid Profile (29942, 33633, THYII) - 244.9/E03.9 -  03/10/2021  o Vitamin D Level (04244) - 268.9/E55.9 - 03/10/2021  o ACO - Pt declines to or was not able to provide an Advance Care Plan or name a Surrogate Decision Maker (1124F) - - 03/10/2021  o ACO-15: Pneumococcal Vaccine Administered or Previously Received [facility] (4040F) - - 03/10/2021  o ACO-14: Influenza immunization administered or previously received [facility] () - - 03/10/2021  o ACO-20: Screening Mammography documented and reviewed [facility] () - - 03/10/2021  o ACO-19: Colorectal cancer screening results documented and reviewed (3017F) - - 03/10/2021  o ACO-13: Fall Risk Screening with no falls in past year or only one fall without injury in the past year (1101F) - - 03/10/2021  o ACO-39: Current medications updated and reviewed (, 1159F) - - 03/10/2021  · Medications  o atenolol 50 mg oral tablet   SIG: take 1 tablet (50 mg) by oral route 2 times per day for 90 days   DISP: (180) Tablet with 1 refills  Refilled on 03/10/2021     o atorvastatin 20 mg oral tablet   SIG: take 1 tablet (20 mg) by oral route once daily at bedtime for 90 days   DISP: (90) Tablet with 1 refills  Refilled on 03/10/2021     o cetirizine 10 mg oral tablet   SIG: take 1 tablet (10 mg) by oral route once daily   DISP: (90) Tablet with 1 refills  Refilled on 03/10/2021     o glipizide 5 mg oral tablet   SIG: TAKE 1 TABLET(5 MG) BY MOUTH TWICE DAILY BEFORE MEALS   DISP: (180) Tablet with 1 refills  Refilled on 03/10/2021     o Jardiance 10 mg oral tablet   SIG: take 1 tablet (10 mg) by oral route once daily in the morning for 90 days   DISP: (90) Tablet with 1 refills  Refilled on 03/10/2021     o levothyroxine 50 mcg oral tablet   SIG: TAKE 1 TABLET BY MOUTH EVERY DAY   DISP: (90) Tablet with 1 refills  Refilled on 03/10/2021     o losartan 100 mg oral tablet   SIG: TAKE 1 TABLET BY MOUTH EVERY DAY   DISP: (90) Tablet with 1 refills  Refilled on 03/10/2021     o Medications have been Reconciled  o Transition of Care or Provider  Policy  · Instructions  o Continue blood sugar monitoring daily and record. Bring your log to office visits. Call the office for readings below 70 and above 250 or any complications.  o Daily foot care. Avoid walking barefoot. Annual Dilated Eye Exam.  o Discussed with patient blood pressure monitoring, hemoglobin A1C levels need to be below 7.0, and LDL (Lipid) goals below 70.  o Patient advised to monitor blood pressure (B/P) at home and journal readings. Patient informed that a B/P reading at home of more than 130/80 is considered hypertension. For readings greater pced301/90 or higher patient is advised to follow up in the office with readings for management. Patient advised to limit sodium intake.  o Advised that cheeses and other sources of dairy fats, animal fats, fast food, and the extras (candy, pastries, pies, doughnuts and cookies) all contain LDL raising nutrients. Advised to increase fruits, vegetables, whole grains, and to monitor portion sizes.   o Patient was educated/instructed on their diagnosis, treatment and medications prior to discharge from the clinic today.  o Call the office with any concerns or questions.  · Disposition  o Return to Office in 6 months.            Electronically Signed by: LORENA Darden -Author on March 10, 2021 02:24:54 PM

## 2021-05-15 VITALS
BODY MASS INDEX: 25.2 KG/M2 | OXYGEN SATURATION: 95 % | HEIGHT: 61 IN | HEART RATE: 85 BPM | DIASTOLIC BLOOD PRESSURE: 65 MMHG | RESPIRATION RATE: 12 BRPM | WEIGHT: 133.5 LBS | SYSTOLIC BLOOD PRESSURE: 135 MMHG

## 2021-05-15 VITALS
DIASTOLIC BLOOD PRESSURE: 65 MMHG | WEIGHT: 138 LBS | TEMPERATURE: 97.6 F | SYSTOLIC BLOOD PRESSURE: 142 MMHG | RESPIRATION RATE: 28 BRPM | BODY MASS INDEX: 26.06 KG/M2 | HEIGHT: 61 IN | OXYGEN SATURATION: 98 % | HEART RATE: 78 BPM

## 2021-05-15 VITALS
OXYGEN SATURATION: 98 % | WEIGHT: 139 LBS | DIASTOLIC BLOOD PRESSURE: 55 MMHG | HEIGHT: 61 IN | SYSTOLIC BLOOD PRESSURE: 155 MMHG | HEART RATE: 62 BPM | BODY MASS INDEX: 26.24 KG/M2

## 2021-05-15 VITALS
RESPIRATION RATE: 18 BRPM | BODY MASS INDEX: 23.98 KG/M2 | OXYGEN SATURATION: 96 % | SYSTOLIC BLOOD PRESSURE: 142 MMHG | DIASTOLIC BLOOD PRESSURE: 47 MMHG | DIASTOLIC BLOOD PRESSURE: 64 MMHG | HEIGHT: 61 IN | SYSTOLIC BLOOD PRESSURE: 154 MMHG | HEART RATE: 73 BPM | WEIGHT: 127 LBS | TEMPERATURE: 98.5 F

## 2021-05-15 VITALS
WEIGHT: 136 LBS | DIASTOLIC BLOOD PRESSURE: 82 MMHG | HEART RATE: 92 BPM | HEIGHT: 61 IN | SYSTOLIC BLOOD PRESSURE: 148 MMHG | OXYGEN SATURATION: 95 % | BODY MASS INDEX: 25.68 KG/M2

## 2021-09-09 ENCOUNTER — OFFICE VISIT (OUTPATIENT)
Dept: FAMILY MEDICINE CLINIC | Facility: CLINIC | Age: 76
End: 2021-09-09

## 2021-09-09 VITALS
OXYGEN SATURATION: 94 % | BODY MASS INDEX: 23.75 KG/M2 | WEIGHT: 125.8 LBS | TEMPERATURE: 97.8 F | SYSTOLIC BLOOD PRESSURE: 136 MMHG | DIASTOLIC BLOOD PRESSURE: 63 MMHG | HEART RATE: 56 BPM | HEIGHT: 61 IN | RESPIRATION RATE: 18 BRPM

## 2021-09-09 DIAGNOSIS — E06.3 HYPOTHYROIDISM DUE TO HASHIMOTO'S THYROIDITIS: ICD-10-CM

## 2021-09-09 DIAGNOSIS — Z71.85 IMMUNIZATION COUNSELING: ICD-10-CM

## 2021-09-09 DIAGNOSIS — Z23 NEED FOR SHINGLES VACCINE: ICD-10-CM

## 2021-09-09 DIAGNOSIS — Z79.4 CONTROLLED TYPE 2 DIABETES MELLITUS WITHOUT COMPLICATION, WITH LONG-TERM CURRENT USE OF INSULIN (HCC): ICD-10-CM

## 2021-09-09 DIAGNOSIS — I10 ESSENTIAL HYPERTENSION: ICD-10-CM

## 2021-09-09 DIAGNOSIS — E11.9 CONTROLLED TYPE 2 DIABETES MELLITUS WITHOUT COMPLICATION, WITH LONG-TERM CURRENT USE OF INSULIN (HCC): ICD-10-CM

## 2021-09-09 DIAGNOSIS — Z00.00 MEDICARE ANNUAL WELLNESS VISIT, SUBSEQUENT: Primary | ICD-10-CM

## 2021-09-09 DIAGNOSIS — E78.2 MIXED HYPERLIPIDEMIA: ICD-10-CM

## 2021-09-09 DIAGNOSIS — E55.9 VITAMIN D DEFICIENCY: ICD-10-CM

## 2021-09-09 DIAGNOSIS — D64.9 ANEMIA, UNSPECIFIED TYPE: ICD-10-CM

## 2021-09-09 DIAGNOSIS — E03.8 HYPOTHYROIDISM DUE TO HASHIMOTO'S THYROIDITIS: ICD-10-CM

## 2021-09-09 DIAGNOSIS — I10 BENIGN ESSENTIAL HYPERTENSION: ICD-10-CM

## 2021-09-09 PROBLEM — E78.5 HYPERLIPIDEMIA: Status: ACTIVE | Noted: 2021-09-09

## 2021-09-09 PROBLEM — K25.9 GASTRIC ULCER: Status: ACTIVE | Noted: 2021-09-09

## 2021-09-09 PROBLEM — E03.9 HYPOTHYROIDISM: Status: ACTIVE | Noted: 2021-09-09

## 2021-09-09 PROBLEM — E53.8 VITAMIN B12 DEFICIENCY: Status: ACTIVE | Noted: 2021-09-09

## 2021-09-09 PROBLEM — M79.673 HEEL PAIN: Status: ACTIVE | Noted: 2021-09-09

## 2021-09-09 PROBLEM — M81.0 OSTEOPOROSIS: Status: ACTIVE | Noted: 2021-09-09

## 2021-09-09 PROBLEM — M19.90 ARTHRITIS: Status: ACTIVE | Noted: 2021-09-09

## 2021-09-09 PROBLEM — H40.9 GLAUCOMA: Status: ACTIVE | Noted: 2021-09-09

## 2021-09-09 PROBLEM — K59.00 CONSTIPATED: Status: ACTIVE | Noted: 2021-09-09

## 2021-09-09 PROBLEM — G43.109 MIGRAINE WITH AURA: Status: ACTIVE | Noted: 2021-09-09

## 2021-09-09 PROCEDURE — 1159F MED LIST DOCD IN RCRD: CPT | Performed by: NURSE PRACTITIONER

## 2021-09-09 PROCEDURE — 96160 PT-FOCUSED HLTH RISK ASSMT: CPT | Performed by: NURSE PRACTITIONER

## 2021-09-09 PROCEDURE — 1170F FXNL STATUS ASSESSED: CPT | Performed by: NURSE PRACTITIONER

## 2021-09-09 PROCEDURE — G0439 PPPS, SUBSEQ VISIT: HCPCS | Performed by: NURSE PRACTITIONER

## 2021-09-09 RX ORDER — LOSARTAN POTASSIUM 100 MG/1
100 TABLET ORAL DAILY
COMMUNITY
Start: 2021-06-04 | End: 2021-09-09 | Stop reason: SDUPTHER

## 2021-09-09 RX ORDER — CALCIUM POLYCARBOPHIL 625 MG
TABLET ORAL
COMMUNITY

## 2021-09-09 RX ORDER — EMPAGLIFLOZIN 10 MG/1
10 TABLET, FILM COATED ORAL DAILY
COMMUNITY
Start: 2021-06-04 | End: 2021-09-09 | Stop reason: SDUPTHER

## 2021-09-09 RX ORDER — MELATONIN
1
COMMUNITY

## 2021-09-09 RX ORDER — GLIPIZIDE 5 MG/1
5 TABLET ORAL
Qty: 180 TABLET | Refills: 1 | Status: SHIPPED | OUTPATIENT
Start: 2021-09-09 | End: 2022-03-10 | Stop reason: SDUPTHER

## 2021-09-09 RX ORDER — LEVOTHYROXINE SODIUM 0.05 MG/1
50 TABLET ORAL
COMMUNITY
Start: 2021-06-04 | End: 2021-09-09 | Stop reason: SDUPTHER

## 2021-09-09 RX ORDER — ATENOLOL 50 MG/1
50 TABLET ORAL 2 TIMES DAILY
Qty: 180 TABLET | Refills: 1 | Status: SHIPPED | OUTPATIENT
Start: 2021-09-09 | End: 2022-03-10 | Stop reason: SDUPTHER

## 2021-09-09 RX ORDER — LEVOTHYROXINE SODIUM 0.05 MG/1
50 TABLET ORAL
Qty: 90 TABLET | Refills: 1 | Status: SHIPPED | OUTPATIENT
Start: 2021-09-09 | End: 2022-03-10 | Stop reason: SDUPTHER

## 2021-09-09 RX ORDER — EMPAGLIFLOZIN 10 MG/1
10 TABLET, FILM COATED ORAL DAILY
Qty: 90 TABLET | Refills: 1 | Status: SHIPPED | OUTPATIENT
Start: 2021-09-09 | End: 2022-03-10 | Stop reason: SDUPTHER

## 2021-09-09 RX ORDER — RIZATRIPTAN BENZOATE 10 MG/1
10 TABLET ORAL ONCE AS NEEDED
COMMUNITY
End: 2022-03-10 | Stop reason: SDUPTHER

## 2021-09-09 RX ORDER — LOSARTAN POTASSIUM 100 MG/1
100 TABLET ORAL DAILY
Qty: 90 TABLET | Refills: 1 | Status: SHIPPED | OUTPATIENT
Start: 2021-09-09 | End: 2022-03-10 | Stop reason: SDUPTHER

## 2021-09-09 RX ORDER — ATORVASTATIN CALCIUM 20 MG/1
20 TABLET, FILM COATED ORAL NIGHTLY
COMMUNITY
Start: 2021-06-14 | End: 2021-09-09 | Stop reason: SDUPTHER

## 2021-09-09 RX ORDER — GLIPIZIDE 5 MG/1
5 TABLET ORAL
COMMUNITY
Start: 2021-06-04 | End: 2021-09-09 | Stop reason: SDUPTHER

## 2021-09-09 RX ORDER — MECLIZINE HCL 12.5 MG/1
TABLET ORAL
COMMUNITY

## 2021-09-09 RX ORDER — IBUPROFEN 600 MG/1
TABLET ORAL
COMMUNITY

## 2021-09-09 RX ORDER — ATORVASTATIN CALCIUM 20 MG/1
20 TABLET, FILM COATED ORAL NIGHTLY
Qty: 90 TABLET | Refills: 1 | Status: SHIPPED | OUTPATIENT
Start: 2021-09-09 | End: 2022-09-12 | Stop reason: SDDI

## 2021-09-09 RX ORDER — SOY PROTEIN
POWDER (GRAM) ORAL
COMMUNITY

## 2021-09-09 RX ORDER — ATENOLOL 50 MG/1
50 TABLET ORAL 2 TIMES DAILY
COMMUNITY
Start: 2021-06-04 | End: 2021-09-09 | Stop reason: SDUPTHER

## 2021-09-09 NOTE — PATIENT INSTRUCTIONS
Advance Care Planning and Advance Directives     You make decisions on a daily basis - decisions about where you want to live, your career, your home, your life. Perhaps one of the most important decisions you face is your choice for future medical care. Take time to talk with your family and your healthcare team and start planning today.  Advance Care Planning is a process that can help you:  · Understand possible future healthcare decisions in light of your own experiences  · Reflect on those decision in light of your goals and values  · Discuss your decisions with those closest to you and the healthcare professionals that care for you  · Make a plan by creating a document that reflects your wishes    Surrogate Decision Maker  In the event of a medical emergency, which has left you unable to communicate or to make your own decisions, you would need someone to make decisions for you.  It is important to discuss your preferences for medical treatment with this person while you are in good health.     Qualities of a surrogate decision maker:  • Willing to take on this role and responsibility  • Knows what you want for future medical care  • Willing to follow your wishes even if they don't agree with them  • Able to make difficult medical decisions under stressful circumstances    Advance Directives  These are legal documents you can create that will guide your healthcare team and decision maker(s) when needed. These documents can be stored in the electronic medical record.    · Living Will - a legal document to guide your care if you have a terminal condition or a serious illness and are unable to communicate. States vary by statute in document names/types, but most forms may include one or more of the following:        -  Directions regarding life-prolonging treatments        -  Directions regarding artificially provided nutrition/hydration        -  Choosing a healthcare decision maker        -  Direction  regarding organ/tissue donation    · Durable Power of  for Healthcare - this document names an -in-fact to make medical decisions for you, but it may also allow this person to make personal and financial decisions for you. Please seek the advice of an  if you need this type of document.    **Advance Directives are not required and no one may discriminate against you if you do not sign one.    Medical Orders  Many states allow specific forms/orders signed by your physician to record your wishes for medical treatment in your current state of health. This form, signed in personal communication with your physician, addresses resuscitation and other medical interventions that you may or may not want.      For more information or to schedule a time with a [facility] Advance Care Planning Facilitator contact: [facility] or call [phone] and someone will contact you directly.    Medicare Wellness  Personal Prevention Plan of Service     Date of Office Visit:    Encounter Provider:  LORENA Darden  Place of Service:  [facility]  Patient Name: Krissy Castillo  :  1945    As part of the Medicare Wellness portion of your visit today, we are providing you with this personalized preventive plan of services (PPPS). This plan is based upon recommendations of the [place] Preventive Services Task Force (USPSTF) and the Advisory Committee on Immunization Practices (ACIP).    This lists the preventive care services that should be considered, and provides dates of when you are due. Items listed as completed are up-to-date and do not require any further intervention.    Health Maintenance   Topic Date Due   • COLORECTAL CANCER SCREENING  Never done   • COVID-19 Vaccine (1) Never done   • ZOSTER VACCINE (1 of 2) Never done   • TDAP/TD VACCINES (2 - Td or Tdap) 2019   • HEPATITIS C SCREENING  Never done   • ANNUAL WELLNESS VISIT   07/23/2021   • DIABETIC FOOT EXAM  07/23/2021   • HEMOGLOBIN A1C  09/10/2021   • URINE MICROALBUMIN  09/16/2021   • INFLUENZA VACCINE  10/01/2021   • DXA SCAN  11/08/2021   • LIPID PANEL  03/10/2022   • DIABETIC EYE EXAM  07/08/2022   • Pneumococcal Vaccine 65+ (2 of 2 - PPSV23) 03/09/2025       No orders of the defined types were placed in this encounter.      No follow-ups on file.        Diabetes Mellitus and Nutrition, Adult  When you have diabetes, or diabetes mellitus, it is very important to have healthy eating habits because your blood sugar (glucose) levels are greatly affected by what you eat and drink. Eating healthy foods in the right amounts, at about the same times every day, can help you:  · Control your blood glucose.  · Lower your risk of heart disease.  · Improve your blood pressure.  · Reach or maintain a healthy weight.  What can affect my meal plan?  Every person with diabetes is different, and each person has different needs for a meal plan. Your health care provider may recommend that you work with a dietitian to make a meal plan that is best for you. Your meal plan may vary depending on factors such as:  · The calories you need.  · The medicines you take.  · Your weight.  · Your blood glucose, blood pressure, and cholesterol levels.  · Your activity level.  · Other health conditions you have, such as heart or kidney disease.  How do carbohydrates affect me?  Carbohydrates, also called carbs, affect your blood glucose level more than any other type of food. Eating carbs naturally raises the amount of glucose in your blood. Carb counting is a method for keeping track of how many carbs you eat. Counting carbs is important to keep your blood glucose at a healthy level, especially if you use insulin or take certain oral diabetes medicines.  It is important to know how many carbs you can safely have in each meal. This is different for every person. Your dietitian can help you calculate how many  "carbs you should have at each meal and for each snack.  How does alcohol affect me?  Alcohol can cause a sudden decrease in blood glucose (hypoglycemia), especially if you use insulin or take certain oral diabetes medicines. Hypoglycemia can be a life-threatening condition. Symptoms of hypoglycemia, such as sleepiness, dizziness, and confusion, are similar to symptoms of having too much alcohol.  · Do not drink alcohol if:  ? Your health care provider tells you not to drink.  ? You are pregnant, may be pregnant, or are planning to become pregnant.  · If you drink alcohol:  ? Do not drink on an empty stomach.  ? Limit how much you use to:  § 0-1 drink a day for women.  § 0-2 drinks a day for men.  ? Be aware of how much alcohol is in your drink. In the [place] one drink equals one 12 oz bottle of beer (355 mL), one 5 oz glass of wine (148 mL), or one 1½ oz glass of hard liquor (44 mL).  ? Keep yourself hydrated with water, diet soda, or unsweetened iced tea.  § Keep in mind that regular soda, juice, and other mixers may contain a lot of sugar and must be counted as carbs.  What are tips for following this plan?    Reading food labels  · Start by checking the serving size on the \"Nutrition Facts\" label of packaged foods and drinks. The amount of calories, carbs, fats, and other nutrients listed on the label is based on one serving of the item. Many items contain more than one serving per package.  · Check the total grams (g) of carbs in one serving. You can calculate the number of servings of carbs in one serving by dividing the total carbs by 15. For example, if a food has 30 g of total carbs per serving, it would be equal to 2 servings of carbs.  · Check the number of grams (g) of saturated fats and trans fats in one serving. Choose foods that have a low amount or none of these fats.  · Check the number of milligrams (mg) of salt (sodium) in one serving. Most people should limit total sodium intake to less than 2,300 " "mg per day.  · Always check the nutrition information of foods labeled as \"low-fat\" or \"nonfat.\" These foods may be higher in added sugar or refined carbs and should be avoided.  · Talk to your dietitian to identify your daily goals for nutrients listed on the label.  Shopping  · Avoid buying canned, pre-made, or processed foods. These foods tend to be high in fat, sodium, and added sugar.  · Shop around the outside edge of the grocery store. This is where you will most often find fresh fruits and vegetables, bulk grains, fresh meats, and fresh dairy.  Cooking  · Use low-heat cooking methods, such as baking, instead of high-heat cooking methods like deep frying.  · Cook using healthy oils, such as olive, canola, or sunflower oil.  · Avoid cooking with butter, cream, or high-fat meats.  Meal planning  · Eat meals and snacks regularly, preferably at the same times every day. Avoid going long periods of time without eating.  · Eat foods that are high in fiber, such as fresh fruits, vegetables, beans, and whole grains. Talk with your dietitian about how many servings of carbs you can eat at each meal.  · Eat 4-6 oz (112-168 g) of lean protein each day, such as lean meat, chicken, fish, eggs, or tofu. One ounce (oz) of lean protein is equal to:  ? 1 oz (28 g) of meat, chicken, or fish.  ? 1 egg.  ? ¼ cup (62 g) of tofu.  · Eat some foods each day that contain healthy fats, such as avocado, nuts, seeds, and fish.  What foods should I eat?  Fruits  Berries. Apples. Oranges. Peaches. Apricots. Plums. Grapes. [place]. Papaya. Pomegranate. Kiwi. Cherries.  Vegetables  Lettuce. Spinach. Leafy greens, including kale, chard, cathy greens, and mustard greens. Beets. Cauliflower. Cabbage. Broccoli. Carrots. Green beans. Tomatoes. Peppers. Onions. Cucumbers. [place] sprouts.  Grains  Whole grains, such as whole-wheat or whole-grain bread, crackers, tortillas, cereal, and pasta. Unsweetened oatmeal. Quinoa. Brown or wild " rice.  Meats and other proteins  Seafood. Poultry without skin. Lean cuts of poultry and beef. Tofu. Nuts. Seeds.  Dairy  Low-fat or fat-free dairy products such as milk, yogurt, and cheese.  The items listed above may not be a complete list of foods and beverages you can eat. Contact a dietitian for more information.  What foods should I avoid?  Fruits  Fruits canned with syrup.  Vegetables  Canned vegetables. Frozen vegetables with butter or cream sauce.  Grains  Refined white flour and flour products such as bread, pasta, snack foods, and cereals. Avoid all processed foods.  Meats and other proteins  Fatty cuts of meat. Poultry with skin. Breaded or fried meats. Processed meat. Avoid saturated fats.  Dairy  Full-fat yogurt, cheese, or milk.  Beverages  Sweetened drinks, such as soda or iced tea.  The items listed above may not be a complete list of foods and beverages you should avoid. Contact a dietitian for more information.  Questions to ask a health care provider  · Do I need to meet with a diabetes educator?  · Do I need to meet with a dietitian?  · What number can I call if I have questions?  · When are the best times to check my blood glucose?  Where to find more information:  · American Diabetes Association: diabetes.org  · Academy of Nutrition and Dietetics: www.eatright.org  · National [place] of Diabetes and Digestive and Kidney Diseases: www.niddk.nih.gov  · Association of Diabetes Care and Education Specialists: www.diabeteseducator.org  Summary  · It is important to have healthy eating habits because your blood sugar (glucose) levels are greatly affected by what you eat and drink.  · A healthy meal plan will help you control your blood glucose and maintain a healthy lifestyle.  · Your health care provider may recommend that you work with a dietitian to make a meal plan that is best for you.  · Keep in mind that carbohydrates (carbs) and alcohol have immediate effects on your blood glucose levels.  It is important to count carbs and to use alcohol carefully.  This information is not intended to replace advice given to you by your health care provider. Make sure you discuss any questions you have with your health care provider.  Document Revised: 11/24/2020 Document Reviewed: 11/24/2020  Elsevier Patient Education © 2021 [employer]

## 2021-09-09 NOTE — PROGRESS NOTES
"Chief Complaint  Follow-up (6 Month ), Hyperlipidemia, Hypertension, Hypothyroidism, and Diabetes Annual wellness visit.      Subjective    History of Present Illness    Krissy Castillo presents for Annual Wellness Visit as well as for follow-up on chronic medical problems including diabetes, hypertension, hyperlipidemia and hypothyroidism.     /70 at home.     DM - bs was 135. Pt denies any low bs.     Hypothyroidism - energy level is good. Pt reports compliance with medication.    Migraines - pt reports has 1 migraine every 2 months, resolved with maxalt.     History of shingles outbreak x 2.         Past Medical History:   Diagnosis Date   • Arthritis    • Constipated    • Diabetes mellitus type II, controlled (CMS/[facility])    • Diverticular disease of colon    • Gastric ulcer    • Glaucoma    • Heel pain    • Hyperlipidemia    • Hypertension    • Hypothyroidism    • Migraine with aura    • Osteoporosis    • Spinal stenosis    • Vitamin B12 deficiency    • Vitamin D deficiency      Past Surgical History:   Procedure Laterality Date   • ADENOIDECTOMY     • CHOLECYSTECTOMY     • COLONOSCOPY  2016    [facility]   • DILATATION AND CURETTAGE     • HYSTERECTOMY     • HYSTERECTOMY     • TONSILLECTOMY       Family History   Problem Relation Age of Onset   • Breast cancer Mother    • Stroke Mother    • Heart disease Mother    • Heart disease Father    • Stroke Sister    • Heart disease Brother    • Heart disease Other    • Heart disease Other    • Stomach cancer Other    • Diabetes Other      Social History     Tobacco Use   • Smoking status: Never Smoker   • Smokeless tobacco: Never Used   Substance Use Topics   • Alcohol use: Never     Vitals:    09/09/21 1426   BP: 136/63   Pulse: 56   Resp: 18   Temp: 97.8 °F (36.6 °C)   TempSrc: Temporal   SpO2: 94%   Weight: 57.1 kg (125 lb 12.8 oz)   Height: 154.9 cm (61\")     Body mass index is 23.77 kg/m².    Result Review :        Subsequent " Medicare Wellness Visit    Chief Complaint   Patient presents with   • Follow-up     6 Month    • Hyperlipidemia   • Hypertension   • Hypothyroidism   • Diabetes      Subjective    History of Present Illness:  Krissy Castillo is a 76 y.o. female who presents for a Subsequent Medicare Wellness Visit.    The following portions of the patient's history were reviewed and   updated as appropriate: allergies, current medications, past family history, past medical history, past social history, past surgical history and problem list.    Compared to one year ago, the patient feels her physical   health is the same.    Compared to one year ago, the patient feels her mental   health is better.    Recent Hospitalizations:  This patient has not had a [facility] admission record on file within the last 365 days.    Current Medical Providers:  Patient Care Team:  Monica Tomlinson APRN as PCP - General (Nurse Practitioner)    Outpatient Medications Prior to Visit   Medication Sig Dispense Refill   • Calcium Carb-Cholecalciferol (Caltrate 600+D3 Soft) 600-800 MG-UNIT chewable tablet      • cholecalciferol (Vitamin D) 25 MCG (1000 UT) tablet Take 1 tablet by mouth.     • Cobalamin Combinations (Vitamin B12-Folic Acid) 500-400 MCG tablet      • ibuprofen (ADVIL,MOTRIN) 600 MG tablet ibuprofen 600 mg oral tablet take 1 tablet (600 mg) by oral route 3 times per day with food   Active     • meclizine (ANTIVERT) 12.5 MG tablet meclizine 12.5 mg oral tablet take 2 tablets (25 mg) by oral route 2 times per day as needed   Active     • rizatriptan (MAXALT) 10 MG tablet Take 10 mg by mouth 1 (One) Time As Needed for Migraine. May repeat in 2 hours if needed     • atenolol (TENORMIN) 50 MG tablet Take 50 mg by mouth 2 (two) times a day.     • atorvastatin (LIPITOR) 20 MG tablet Take 20 mg by mouth Every Night.     • glipizide (GLUCOTROL) 5 MG tablet Take 5 mg by mouth 2 (Two) Times a Day Before Meals.     • Jardiance 10 MG tablet  tablet Take 10 mg by mouth Daily.     • levothyroxine (SYNTHROID, LEVOTHROID) 50 MCG tablet Take 50 mcg by mouth Every Morning.     • losartan (COZAAR) 100 MG tablet Take 100 mg by mouth Daily.     • calcium polycarbophil (FiberCon) 625 MG tablet        No facility-administered medications prior to visit.       No opioid medication identified on active medication list. I have reviewed chart for other potential  high risk medication/s and harmful drug interactions in the elderly.          Aspirin is not on active medication list.  Aspirin use is not indicated based on review of current medical condition/s. Risk of harm outweighs potential benefits.  .    Patient Active Problem List   Diagnosis   • Heel pain   • Arthritis   • Benign essential hypertension   • Constipated   • Diverticular disease of colon   • Gastric ulcer   • Glaucoma   • Hyperlipidemia   • Hypertension   • Hypothyroidism   • Migraine with aura   • Osteoporosis   • Spinal stenosis   • Diabetes mellitus type II, controlled (CMS/[facility])   • Vitamin B12 deficiency   • Vitamin D deficiency     Advance Care Planning  Advance Directive is not on file.  ACP discussion was held with the patient during this visit. Patient has an advance directive (not in EMR), copy requested.    Review of Systems   Constitutional: Negative for fatigue and fever.   Respiratory: Negative for cough, chest tightness and shortness of breath.    Gastrointestinal: Negative for abdominal pain, constipation and diarrhea.   Genitourinary: Negative for difficulty urinating.   Musculoskeletal: Positive for arthralgias (knee pain).   Neurological: Negative for dizziness and light-headedness.   Psychiatric/Behavioral: Positive for sleep disturbance. Negative for suicidal ideas. The patient is not nervous/anxious.         Objective    Vitals:    09/09/21 1426   BP: 136/63   Pulse: 56   Resp: 18   Temp: 97.8 °F (36.6 °C)   TempSrc: Temporal   SpO2: 94%   Weight: 57.1 kg (125 lb 12.8 oz)  "  Height: 154.9 cm (61\")   PainSc: 0-No pain     BMI Readings from Last 1 Encounters:   09/09/21 23.77 kg/m²   BMI is within normal parameters. No follow-up required.    Does the patient have evidence of cognitive impairment? No    Physical Exam  Constitutional:       General: She is not in acute distress.     Appearance: Normal appearance.   Eyes:      General: Lids are normal. No scleral icterus.        Left eye: No discharge.      Conjunctiva/sclera: Conjunctivae normal.      Pupils: Pupils are equal, round, and reactive to light.   Neck:      Thyroid: No thyroid mass, thyromegaly or thyroid tenderness.      Vascular: No carotid bruit.   Cardiovascular:      Rate and Rhythm: Normal rate.      Pulses: Normal pulses.           Dorsalis pedis pulses are 2+ on the right side and 2+ on the left side.      Heart sounds: Normal heart sounds. No murmur heard.   No friction rub. No gallop.    Pulmonary:      Effort: Pulmonary effort is normal. No retractions.      Breath sounds: Normal breath sounds.   Abdominal:      General: Abdomen is flat. Bowel sounds are normal. There is no distension.      Palpations: Abdomen is soft.      Tenderness: There is no abdominal tenderness. There is no guarding.   Musculoskeletal:      Cervical back: Full passive range of motion without pain, normal range of motion and neck supple. No rigidity or tenderness.      Right lower leg: No edema.      Left lower leg: No edema.      Right foot: Normal range of motion.      Left foot: Normal range of motion.   Feet:      Right foot:      Protective Sensation: 8 sites tested. 8 sites sensed.      Skin integrity: Skin integrity normal. No ulcer or blister.      Toenail Condition: Right toenails are normal.      Left foot:      Protective Sensation: 8 sites tested. 8 sites sensed.      Skin integrity: Skin integrity normal. No ulcer or blister.      Toenail Condition: Left toenails are normal.      Comments: Diabetic Foot Exam Performed   "   Lymphadenopathy:      Cervical: No cervical adenopathy.   Skin:     General: Skin is warm and dry.      Findings: No lesion or rash.      Nails: There is no clubbing.   Neurological:      General: No focal deficit present.      Mental Status: She is alert and oriented to person, place, and time.      Coordination: Coordination is intact.      Gait: Gait is intact.   Psychiatric:         Attention and Perception: Attention normal.         Mood and Affect: Mood and affect normal.         Speech: Speech normal.         Behavior: Behavior normal. Behavior is cooperative.         Thought Content: Thought content normal.         Cognition and Memory: Cognition normal.         Judgment: Judgment normal.                 HEALTH RISK ASSESSMENT    Smoking Status:  Social History     Tobacco Use   Smoking Status Never Smoker   Smokeless Tobacco Never Used     Alcohol Consumption:  Social History     Substance and Sexual Activity   Alcohol Use Never     Fall Risk Screen:    Critical access hospital Fall Risk Assessment was completed, and patient is at LOW risk for falls.Assessment completed on:9/9/2021    Depression Screening:  PHQ-2/PHQ-9 Depression Screening 9/9/2021   Little interest or pleasure in doing things 0   Feeling down, depressed, or hopeless 0   Total Score 0       Health Habits and Functional and Cognitive Screening:  Functional & Cognitive Status 9/9/2021   Do you have difficulty preparing food and eating? No   Do you have difficulty bathing yourself, getting dressed or grooming yourself? No   Do you have difficulty using the toilet? No   Do you have difficulty moving around from place to place? No   Do you have trouble with steps or getting out of a bed or a chair? No   Current Diet Diabetic Diet   Dental Exam Not up to date   Eye Exam Up to date   Exercise (times per week) 7 times per week   Current Exercises Include Walking   Do you need help using the phone?  No   Are you deaf or do you have serious difficulty hearing?  No    Do you need help with transportation? No   Do you need help shopping? No   Do you need help preparing meals?  No   Do you need help with housework?  No   Do you need help with laundry? No   Do you need help taking your medications? No   Do you need help managing money? No   Do you ever drive or ride in a car without wearing a seat belt? No   Have you felt unusual stress, anger or loneliness in the last month? No   Who do you live with? Alone   If you need help, do you have trouble finding someone available to you? No   Have you been bothered in the last four weeks by sexual problems? No   Do you have difficulty concentrating, remembering or making decisions? No       Age-appropriate Screening Schedule:  Refer to the list below for future screening recommendations based on patient's age, sex and/or medical conditions. Orders for these recommended tests are listed in the plan section. The patient has been provided with a written plan.    Health Maintenance   Topic Date Due   • ZOSTER VACCINE (1 of 2) Never done   • TDAP/TD VACCINES (2 - Td or Tdap) 05/11/2019   • HEMOGLOBIN A1C  09/10/2021   • URINE MICROALBUMIN  09/16/2021   • INFLUENZA VACCINE  10/01/2021   • DXA SCAN  11/08/2021   • LIPID PANEL  03/10/2022   • DIABETIC EYE EXAM  07/08/2022   • DIABETIC FOOT EXAM  09/09/2022              Assessment/Plan   CMS Preventative Services Quick Reference  Risk Factors Identified During Encounter  Alcohol Misuse  Depression/Dysphoria  Fall Risk-High or Moderate    The above risks/problems have been discussed with the patient.  Follow up actions/plans if indicated are seen below in the Assessment/Plan Section.  Pertinent information has been shared with the patient in the After Visit Summary.    Diagnoses and all orders for this visit:    1. Medicare annual wellness visit, subsequent (Primary)    2. Immunization counseling    3. Benign essential hypertension    4. Mixed hyperlipidemia  -     Comprehensive Metabolic Panel;  Future  -     Lipid Panel; Future  -     atorvastatin (LIPITOR) 20 MG tablet; Take 1 tablet by mouth Every Night.  Dispense: 90 tablet; Refill: 1    5. Essential hypertension  -     atenolol (TENORMIN) 50 MG tablet; Take 1 tablet by mouth 2 (two) times a day.  Dispense: 180 tablet; Refill: 1  -     losartan (COZAAR) 100 MG tablet; Take 1 tablet by mouth Daily.  Dispense: 90 tablet; Refill: 1    6. Controlled type 2 diabetes mellitus without complication, with long-term current use of insulin (CMS/[facility])  -     Hemoglobin A1c; Future  -     Microalbumin / Creatinine Urine Ratio - Urine, Clean Catch; Future  -     TSH+Free T4; Future  -     glipizide (GLUCOTROL) 5 MG tablet; Take 1 tablet by mouth 2 (Two) Times a Day Before Meals.  Dispense: 180 tablet; Refill: 1  -     Jardiance 10 MG tablet tablet; Take 1 tablet by mouth Daily.  Dispense: 90 tablet; Refill: 1    7. Vitamin D deficiency  -     Vitamin D 25 Hydroxy; Future    8. Anemia, unspecified type  -     Vitamin B12; Future    9. Hypothyroidism due to Hashimoto's thyroiditis  -     levothyroxine (SYNTHROID, LEVOTHROID) 50 MCG tablet; Take 1 tablet by mouth Every Morning.  Dispense: 90 tablet; Refill: 1    10. Need for shingles vaccine  -     Zoster Vac Recomb Adjuvanted 50 MCG/0.5ML reconstituted suspension; Inject 0.5 mL into the appropriate muscle as directed by prescriber 1 (One) Time for 1 dose. Repeat in 1-5 months for final dose.  Dispense: 1 each; Refill: 1    Other orders  -     Cancel: Ambulatory Referral For Screening Colonoscopy (Gastroenterology)        Follow Up:   Return in about 6 months (around 3/9/2022) for Next scheduled follow up.     An After Visit Summary and PPPS were made available to the patient.    Updated annual wellness visit checklist.  Immunizations up-to-date.  Screening up-to-date or ordered.  Recommend yearly dental and eye exams. Also discussed monitoring of blood pressure and lipids.      LORENA Darden

## 2021-09-17 ENCOUNTER — LAB (OUTPATIENT)
Dept: FAMILY MEDICINE CLINIC | Facility: CLINIC | Age: 76
End: 2021-09-17

## 2021-09-17 DIAGNOSIS — E78.2 MIXED HYPERLIPIDEMIA: ICD-10-CM

## 2021-09-17 DIAGNOSIS — E11.9 CONTROLLED TYPE 2 DIABETES MELLITUS WITHOUT COMPLICATION, WITH LONG-TERM CURRENT USE OF INSULIN (HCC): ICD-10-CM

## 2021-09-17 DIAGNOSIS — Z79.4 CONTROLLED TYPE 2 DIABETES MELLITUS WITHOUT COMPLICATION, WITH LONG-TERM CURRENT USE OF INSULIN (HCC): ICD-10-CM

## 2021-09-17 DIAGNOSIS — D64.9 ANEMIA, UNSPECIFIED TYPE: ICD-10-CM

## 2021-09-17 DIAGNOSIS — E55.9 VITAMIN D DEFICIENCY: ICD-10-CM

## 2021-09-17 LAB
25(OH)D3 SERPL-MCNC: 36.7 NG/ML
ALBUMIN SERPL-MCNC: 4.2 G/DL (ref 3.5–5.2)
ALBUMIN UR-MCNC: <1.2 MG/DL
ALBUMIN/GLOB SERPL: 1.6 G/DL
ALP SERPL-CCNC: 88 U/L (ref 39–117)
ALT SERPL W P-5'-P-CCNC: 23 U/L (ref 1–33)
ANION GAP SERPL CALCULATED.3IONS-SCNC: 10.9 MMOL/L (ref 5–15)
AST SERPL-CCNC: 23 U/L (ref 1–32)
BILIRUB SERPL-MCNC: 0.5 MG/DL (ref 0–1.2)
BUN SERPL-MCNC: 17 MG/DL (ref 8–23)
BUN/CREAT SERPL: 23 (ref 7–25)
CALCIUM SPEC-SCNC: 10.1 MG/DL (ref 8.6–10.5)
CHLORIDE SERPL-SCNC: 102 MMOL/L (ref 98–107)
CHOLEST SERPL-MCNC: 137 MG/DL (ref 0–200)
CO2 SERPL-SCNC: 26.1 MMOL/L (ref 22–29)
CREAT SERPL-MCNC: 0.74 MG/DL (ref 0.57–1)
CREAT UR-MCNC: 30.4 MG/DL
GFR SERPL CREATININE-BSD FRML MDRD: 77 ML/MIN/1.73
GLOBULIN UR ELPH-MCNC: 2.7 GM/DL
GLUCOSE SERPL-MCNC: 167 MG/DL (ref 65–99)
HBA1C MFR BLD: 7.39 % (ref 4.8–5.6)
HDLC SERPL-MCNC: 46 MG/DL (ref 40–60)
LDLC SERPL CALC-MCNC: 75 MG/DL (ref 0–100)
LDLC/HDLC SERPL: 1.63 {RATIO}
MICROALBUMIN/CREAT UR: NORMAL MG/G{CREAT}
POTASSIUM SERPL-SCNC: 4.5 MMOL/L (ref 3.5–5.2)
PROT SERPL-MCNC: 6.9 G/DL (ref 6–8.5)
SODIUM SERPL-SCNC: 139 MMOL/L (ref 136–145)
T4 FREE SERPL-MCNC: 1.35 NG/DL (ref 0.93–1.7)
TRIGL SERPL-MCNC: 80 MG/DL (ref 0–150)
TSH SERPL DL<=0.05 MIU/L-ACNC: 2.48 UIU/ML (ref 0.27–4.2)
VIT B12 BLD-MCNC: >2000 PG/ML (ref 211–946)
VLDLC SERPL-MCNC: 16 MG/DL (ref 5–40)

## 2021-09-17 PROCEDURE — 82607 VITAMIN B-12: CPT | Performed by: NURSE PRACTITIONER

## 2021-09-17 PROCEDURE — 80053 COMPREHEN METABOLIC PANEL: CPT | Performed by: NURSE PRACTITIONER

## 2021-09-17 PROCEDURE — 36415 COLL VENOUS BLD VENIPUNCTURE: CPT | Performed by: NURSE PRACTITIONER

## 2021-09-17 PROCEDURE — 84439 ASSAY OF FREE THYROXINE: CPT | Performed by: NURSE PRACTITIONER

## 2021-09-17 PROCEDURE — 82043 UR ALBUMIN QUANTITATIVE: CPT | Performed by: NURSE PRACTITIONER

## 2021-09-17 PROCEDURE — 82306 VITAMIN D 25 HYDROXY: CPT | Performed by: NURSE PRACTITIONER

## 2021-09-17 PROCEDURE — 83036 HEMOGLOBIN GLYCOSYLATED A1C: CPT | Performed by: NURSE PRACTITIONER

## 2021-09-17 PROCEDURE — 84443 ASSAY THYROID STIM HORMONE: CPT | Performed by: NURSE PRACTITIONER

## 2021-09-17 PROCEDURE — 82570 ASSAY OF URINE CREATININE: CPT | Performed by: NURSE PRACTITIONER

## 2021-09-17 PROCEDURE — 80061 LIPID PANEL: CPT | Performed by: NURSE PRACTITIONER

## 2022-03-10 ENCOUNTER — OFFICE VISIT (OUTPATIENT)
Dept: FAMILY MEDICINE CLINIC | Facility: CLINIC | Age: 77
End: 2022-03-10

## 2022-03-10 VITALS
HEART RATE: 62 BPM | TEMPERATURE: 98 F | SYSTOLIC BLOOD PRESSURE: 141 MMHG | BODY MASS INDEX: 23.79 KG/M2 | HEIGHT: 61 IN | DIASTOLIC BLOOD PRESSURE: 57 MMHG | OXYGEN SATURATION: 97 % | WEIGHT: 126 LBS

## 2022-03-10 DIAGNOSIS — I10 ESSENTIAL HYPERTENSION: ICD-10-CM

## 2022-03-10 DIAGNOSIS — Z79.4 CONTROLLED TYPE 2 DIABETES MELLITUS WITHOUT COMPLICATION, WITH LONG-TERM CURRENT USE OF INSULIN: Primary | ICD-10-CM

## 2022-03-10 DIAGNOSIS — Z23 NEED FOR PNEUMOCOCCAL VACCINATION: ICD-10-CM

## 2022-03-10 DIAGNOSIS — E06.3 HYPOTHYROIDISM DUE TO HASHIMOTO'S THYROIDITIS: ICD-10-CM

## 2022-03-10 DIAGNOSIS — F51.01 PRIMARY INSOMNIA: ICD-10-CM

## 2022-03-10 DIAGNOSIS — E78.2 MIXED HYPERLIPIDEMIA: ICD-10-CM

## 2022-03-10 DIAGNOSIS — E55.9 VITAMIN D DEFICIENCY: ICD-10-CM

## 2022-03-10 DIAGNOSIS — G43.119 INTRACTABLE MIGRAINE WITH AURA WITHOUT STATUS MIGRAINOSUS: ICD-10-CM

## 2022-03-10 DIAGNOSIS — E03.8 HYPOTHYROIDISM DUE TO HASHIMOTO'S THYROIDITIS: ICD-10-CM

## 2022-03-10 DIAGNOSIS — E11.9 CONTROLLED TYPE 2 DIABETES MELLITUS WITHOUT COMPLICATION, WITH LONG-TERM CURRENT USE OF INSULIN: Primary | ICD-10-CM

## 2022-03-10 LAB
25(OH)D3 SERPL-MCNC: 33.2 NG/ML (ref 30–100)
ALBUMIN SERPL-MCNC: 4.5 G/DL (ref 3.5–5.2)
ALBUMIN/GLOB SERPL: 1.7 G/DL
ALP SERPL-CCNC: 89 U/L (ref 39–117)
ALT SERPL W P-5'-P-CCNC: 26 U/L (ref 1–33)
ANION GAP SERPL CALCULATED.3IONS-SCNC: 10.5 MMOL/L (ref 5–15)
AST SERPL-CCNC: 20 U/L (ref 1–32)
BILIRUB SERPL-MCNC: 0.7 MG/DL (ref 0–1.2)
BUN SERPL-MCNC: 13 MG/DL (ref 8–23)
BUN/CREAT SERPL: 13.7 (ref 7–25)
CALCIUM SPEC-SCNC: 10.7 MG/DL (ref 8.6–10.5)
CHLORIDE SERPL-SCNC: 104 MMOL/L (ref 98–107)
CHOLEST SERPL-MCNC: 142 MG/DL (ref 0–200)
CO2 SERPL-SCNC: 26.5 MMOL/L (ref 22–29)
CREAT SERPL-MCNC: 0.95 MG/DL (ref 0.57–1)
EGFRCR SERPLBLD CKD-EPI 2021: 62.6 ML/MIN/1.73
GLOBULIN UR ELPH-MCNC: 2.7 GM/DL
GLUCOSE SERPL-MCNC: 207 MG/DL (ref 65–99)
HBA1C MFR BLD: 7.8 % (ref 4.8–5.6)
HDLC SERPL-MCNC: 51 MG/DL (ref 40–60)
LDLC SERPL CALC-MCNC: 61 MG/DL (ref 0–100)
LDLC/HDLC SERPL: 1.07 {RATIO}
POTASSIUM SERPL-SCNC: 4.7 MMOL/L (ref 3.5–5.2)
PROT SERPL-MCNC: 7.2 G/DL (ref 6–8.5)
SODIUM SERPL-SCNC: 141 MMOL/L (ref 136–145)
TRIGL SERPL-MCNC: 181 MG/DL (ref 0–150)
TSH SERPL DL<=0.05 MIU/L-ACNC: 1.57 UIU/ML (ref 0.27–4.2)
VLDLC SERPL-MCNC: 30 MG/DL (ref 5–40)

## 2022-03-10 PROCEDURE — 84443 ASSAY THYROID STIM HORMONE: CPT | Performed by: NURSE PRACTITIONER

## 2022-03-10 PROCEDURE — 80053 COMPREHEN METABOLIC PANEL: CPT | Performed by: NURSE PRACTITIONER

## 2022-03-10 PROCEDURE — 82306 VITAMIN D 25 HYDROXY: CPT | Performed by: NURSE PRACTITIONER

## 2022-03-10 PROCEDURE — 99214 OFFICE O/P EST MOD 30 MIN: CPT | Performed by: NURSE PRACTITIONER

## 2022-03-10 PROCEDURE — 80061 LIPID PANEL: CPT | Performed by: NURSE PRACTITIONER

## 2022-03-10 PROCEDURE — 83036 HEMOGLOBIN GLYCOSYLATED A1C: CPT | Performed by: NURSE PRACTITIONER

## 2022-03-10 PROCEDURE — 36415 COLL VENOUS BLD VENIPUNCTURE: CPT | Performed by: NURSE PRACTITIONER

## 2022-03-10 RX ORDER — LEVOTHYROXINE SODIUM 0.05 MG/1
50 TABLET ORAL
Qty: 90 TABLET | Refills: 1 | Status: SHIPPED | OUTPATIENT
Start: 2022-03-10 | End: 2022-09-06 | Stop reason: SDUPTHER

## 2022-03-10 RX ORDER — GLIPIZIDE 5 MG/1
5 TABLET ORAL
Qty: 180 TABLET | Refills: 1 | Status: SHIPPED | OUTPATIENT
Start: 2022-03-10 | End: 2022-09-06 | Stop reason: SDUPTHER

## 2022-03-10 RX ORDER — LOSARTAN POTASSIUM 100 MG/1
100 TABLET ORAL DAILY
Qty: 90 TABLET | Refills: 1 | Status: SHIPPED | OUTPATIENT
Start: 2022-03-10 | End: 2022-09-06 | Stop reason: SDUPTHER

## 2022-03-10 RX ORDER — ATENOLOL 50 MG/1
50 TABLET ORAL 2 TIMES DAILY
Qty: 180 TABLET | Refills: 1 | Status: SHIPPED | OUTPATIENT
Start: 2022-03-10 | End: 2022-09-06 | Stop reason: SDUPTHER

## 2022-03-10 RX ORDER — RIZATRIPTAN BENZOATE 10 MG/1
10 TABLET ORAL ONCE AS NEEDED
Qty: 12 TABLET | Refills: 1 | Status: SHIPPED | OUTPATIENT
Start: 2022-03-10 | End: 2023-03-20 | Stop reason: SDUPTHER

## 2022-03-10 RX ORDER — EMPAGLIFLOZIN 10 MG/1
10 TABLET, FILM COATED ORAL DAILY
Qty: 90 TABLET | Refills: 1 | Status: SHIPPED | OUTPATIENT
Start: 2022-03-10 | End: 2022-09-06 | Stop reason: SDUPTHER

## 2022-03-10 RX ORDER — HYDROXYZINE HYDROCHLORIDE 25 MG/1
25 TABLET, FILM COATED ORAL NIGHTLY PRN
Qty: 30 TABLET | Refills: 1 | Status: SHIPPED | OUTPATIENT
Start: 2022-03-10 | End: 2022-06-08 | Stop reason: SDUPTHER

## 2022-03-10 RX ORDER — EMPAGLIFLOZIN 10 MG/1
TABLET, FILM COATED ORAL
COMMUNITY
Start: 2021-12-07 | End: 2022-03-10

## 2022-03-10 NOTE — PATIENT INSTRUCTIONS
Diabetes Mellitus and Nutrition, Adult  When you have diabetes, or diabetes mellitus, it is very important to have healthy eating habits because your blood sugar (glucose) levels are greatly affected by what you eat and drink. Eating healthy foods in the right amounts, at about the same times every day, can help you:  Control your blood glucose.  Lower your risk of heart disease.  Improve your blood pressure.  Reach or maintain a healthy weight.  What can affect my meal plan?  Every person with diabetes is different, and each person has different needs for a meal plan. Your health care provider may recommend that you work with a dietitian to make a meal plan that is best for you. Your meal plan may vary depending on factors such as:  The calories you need.  The medicines you take.  Your weight.  Your blood glucose, blood pressure, and cholesterol levels.  Your activity level.  Other health conditions you have, such as heart or kidney disease.  How do carbohydrates affect me?  Carbohydrates, also called carbs, affect your blood glucose level more than any other type of food. Eating carbs naturally raises the amount of glucose in your blood. Carb counting is a method for keeping track of how many carbs you eat. Counting carbs is important to keep your blood glucose at a healthy level, especially if you use insulin or take certain oral diabetes medicines.  It is important to know how many carbs you can safely have in each meal. This is different for every person. Your dietitian can help you calculate how many carbs you should have at each meal and for each snack.  How does alcohol affect me?  Alcohol can cause a sudden decrease in blood glucose (hypoglycemia), especially if you use insulin or take certain oral diabetes medicines. Hypoglycemia can be a life-threatening condition. Symptoms of hypoglycemia, such as sleepiness, dizziness, and confusion, are similar to symptoms of having too much alcohol.  Do not drink  "alcohol if:  Your health care provider tells you not to drink.  You are pregnant, may be pregnant, or are planning to become pregnant.  If you drink alcohol:  Do not drink on an empty stomach.  Limit how much you use to:  0-1 drink a day for women.  0-2 drinks a day for men.  Be aware of how much alcohol is in your drink. In the [place] one drink equals one 12 oz bottle of beer (355 mL), one 5 oz glass of wine (148 mL), or one 1½ oz glass of hard liquor (44 mL).  Keep yourself hydrated with water, diet soda, or unsweetened iced tea.  Keep in mind that regular soda, juice, and other mixers may contain a lot of sugar and must be counted as carbs.  What are tips for following this plan?    Reading food labels  Start by checking the serving size on the \"Nutrition Facts\" label of packaged foods and drinks. The amount of calories, carbs, fats, and other nutrients listed on the label is based on one serving of the item. Many items contain more than one serving per package.  Check the total grams (g) of carbs in one serving. You can calculate the number of servings of carbs in one serving by dividing the total carbs by 15. For example, if a food has 30 g of total carbs per serving, it would be equal to 2 servings of carbs.  Check the number of grams (g) of saturated fats and trans fats in one serving. Choose foods that have a low amount or none of these fats.  Check the number of milligrams (mg) of salt (sodium) in one serving. Most people should limit total sodium intake to less than 2,300 mg per day.  Always check the nutrition information of foods labeled as \"low-fat\" or \"nonfat.\" These foods may be higher in added sugar or refined carbs and should be avoided.  Talk to your dietitian to identify your daily goals for nutrients listed on the label.  Shopping  Avoid buying canned, pre-made, or processed foods. These foods tend to be high in fat, sodium, and added sugar.  Shop around the outside edge of the grocery store. This " is where you will most often find fresh fruits and vegetables, bulk grains, fresh meats, and fresh dairy.  Cooking  Use low-heat cooking methods, such as baking, instead of high-heat cooking methods like deep frying.  Cook using healthy oils, such as olive, canola, or sunflower oil.  Avoid cooking with butter, cream, or high-fat meats.  Meal planning  Eat meals and snacks regularly, preferably at the same times every day. Avoid going long periods of time without eating.  Eat foods that are high in fiber, such as fresh fruits, vegetables, beans, and whole grains. Talk with your dietitian about how many servings of carbs you can eat at each meal.  Eat 4-6 oz (112-168 g) of lean protein each day, such as lean meat, chicken, fish, eggs, or tofu. One ounce (oz) of lean protein is equal to:  1 oz (28 g) of meat, chicken, or fish.  1 egg.  ¼ cup (62 g) of tofu.  Eat some foods each day that contain healthy fats, such as avocado, nuts, seeds, and fish.  What foods should I eat?  Fruits  Berries. Apples. Oranges. Peaches. Apricots. Plums. Grapes. [place]. Papaya. Pomegranate. Kiwi. Cherries.  Vegetables  Lettuce. Spinach. Leafy greens, including kale, chard, cathy greens, and mustard greens. Beets. Cauliflower. Cabbage. Broccoli. Carrots. Green beans. Tomatoes. Peppers. Onions. Cucumbers. [place] sprouts.  Grains  Whole grains, such as whole-wheat or whole-grain bread, crackers, tortillas, cereal, and pasta. Unsweetened oatmeal. Quinoa. Brown or wild rice.  Meats and other proteins  Seafood. Poultry without skin. Lean cuts of poultry and beef. Tofu. Nuts. Seeds.  Dairy  Low-fat or fat-free dairy products such as milk, yogurt, and cheese.  The items listed above may not be a complete list of foods and beverages you can eat. Contact a dietitian for more information.  What foods should I avoid?  Fruits  Fruits canned with syrup.  Vegetables  Canned vegetables. Frozen vegetables with butter or cream sauce.  Grains  Refined  white flour and flour products such as bread, pasta, snack foods, and cereals. Avoid all processed foods.  Meats and other proteins  Fatty cuts of meat. Poultry with skin. Breaded or fried meats. Processed meat. Avoid saturated fats.  Dairy  Full-fat yogurt, cheese, or milk.  Beverages  Sweetened drinks, such as soda or iced tea.  The items listed above may not be a complete list of foods and beverages you should avoid. Contact a dietitian for more information.  Questions to ask a health care provider  Do I need to meet with a diabetes educator?  Do I need to meet with a dietitian?  What number can I call if I have questions?  When are the best times to check my blood glucose?  Where to find more information:  American Diabetes Association: diabetes.org  Academy of Nutrition and Dietetics: www.eatright.org  National [place] of Diabetes and Digestive and Kidney Diseases: www.niddk.nih.gov  Association of Diabetes Care and Education Specialists: www.diabeteseducator.org  Summary  It is important to have healthy eating habits because your blood sugar (glucose) levels are greatly affected by what you eat and drink.  A healthy meal plan will help you control your blood glucose and maintain a healthy lifestyle.  Your health care provider may recommend that you work with a dietitian to make a meal plan that is best for you.  Keep in mind that carbohydrates (carbs) and alcohol have immediate effects on your blood glucose levels. It is important to count carbs and to use alcohol carefully.  This information is not intended to replace advice given to you by your health care provider. Make sure you discuss any questions you have with your health care provider.  Document Revised: 11/24/2020 Document Reviewed: 11/24/2020  Elsevier Patient Education © 2021 [employer]

## 2022-03-10 NOTE — PROGRESS NOTES
Chief Complaint  Follow-up (6 month ), Hyperlipidemia, Hypertension, and Hypothyroidism    Subjective          Krissy Castillo is a 76 y.o. female who presents to [facility]    History of Present Illness    HTN - mild elevation. Pt reports wnl at home.     HLD - denies any myalgias.     Memory recall - pt reports she has to find names a lot of times. Pt reports daughter reports she forgets what she has told her. Pt has not missed any appts or paying bills.     Pt is not sleeping well. Pt reports she tosses and turns a lot. Pt lost her  a few years ago.     Migraine - pt reports she has had 1 migraines in the last month. Resolves with maxalt within 1 hour.     DM - pt reports compliance with med. Has not checked at home d/t glucometer needing battery.     Hypothyroid - reports compliance with med. Energy level is good.     PHQ-2 Total Score:     PHQ-9 Total Score:         Review of Systems   Constitutional: Negative for chills, fatigue and fever.   Respiratory: Negative for cough and shortness of breath.    Cardiovascular: Negative for chest pain and palpitations.   Gastrointestinal: Negative for constipation, diarrhea, nausea and vomiting.   Musculoskeletal: Negative for back pain and neck pain.   Skin: Negative for rash.   Neurological: Negative for dizziness and headaches.   Psychiatric/Behavioral:        Memory issues.           Medical History: has a past medical history of Arthritis, Constipated, Diabetes mellitus type II, controlled (HCC), Diverticular disease of colon, Gastric ulcer, Glaucoma, Heel pain, Hyperlipidemia, Hypertension, Hypothyroidism, Migraine with aura, Osteoporosis, Spinal stenosis, Vitamin B12 deficiency, and Vitamin D deficiency.     Surgical History: has a past surgical history that includes Hysterectomy; Adenoidectomy; Cholecystectomy; Colonoscopy (2016); Dilation and curettage of uterus; Hysterectomy; and Tonsillectomy.     Family History:  family history includes Breast cancer in her mother; Diabetes in an other family member; Heart disease in her brother, father, mother, and other family members; Stomach cancer in an other family member; Stroke in her mother and sister.     Social History: reports that she has never smoked. She has never used smokeless tobacco. She reports that she does not drink alcohol and does not use drugs.    Allergies: Patient has no known allergies.      Health Maintenance Due   Topic Date Due   • COLORECTAL CANCER SCREENING  Never done   • ZOSTER VACCINE (1 of 2) Never done   • Pneumococcal Vaccine 65+ (2 of 2 - PPSV23) 03/09/2021   • HEPATITIS C SCREENING  Never done            Current Outpatient Medications:   •  atenolol (TENORMIN) 50 MG tablet, Take 1 tablet by mouth 2 (Two) Times a Day., Disp: 180 tablet, Rfl: 1  •  atorvastatin (LIPITOR) 20 MG tablet, Take 1 tablet by mouth Every Night., Disp: 90 tablet, Rfl: 1  •  Calcium Carb-Cholecalciferol (Caltrate 600+D3 Soft) 600-800 MG-UNIT chewable tablet, , Disp: , Rfl:   •  calcium polycarbophil (FiberCon) 625 MG tablet, , Disp: , Rfl:   •  cholecalciferol (VITAMIN D3) 25 MCG (1000 UT) tablet, Take 1 tablet by mouth., Disp: , Rfl:   •  Cobalamin Combinations (Vitamin B12-Folic Acid) 500-400 MCG tablet, , Disp: , Rfl:   •  glipizide (GLUCOTROL) 5 MG tablet, Take 1 tablet by mouth 2 (Two) Times a Day Before Meals., Disp: 180 tablet, Rfl: 1  •  ibuprofen (ADVIL,MOTRIN) 600 MG tablet, ibuprofen 600 mg oral tablet take 1 tablet (600 mg) by oral route 3 times per day with food   Active, Disp: , Rfl:   •  Jardiance 10 MG tablet tablet, Take 1 tablet by mouth Daily., Disp: 90 tablet, Rfl: 1  •  levothyroxine (SYNTHROID, LEVOTHROID) 50 MCG tablet, Take 1 tablet by mouth Every Morning., Disp: 90 tablet, Rfl: 1  •  losartan (COZAAR) 100 MG tablet, Take 1 tablet by mouth Daily., Disp: 90 tablet, Rfl: 1  •  meclizine (ANTIVERT) 12.5 MG tablet, meclizine 12.5 mg oral tablet take 2 tablets  "(25 mg) by oral route 2 times per day as needed   Active, Disp: , Rfl:   •  rizatriptan (MAXALT) 10 MG tablet, Take 1 tablet by mouth 1 (One) Time As Needed for Migraine. May repeat in 2 hours if needed, Disp: 12 tablet, Rfl: 1  •  hydrOXYzine (ATARAX) 25 MG tablet, Take 1 tablet by mouth At Night As Needed for Anxiety., Disp: 30 tablet, Rfl: 1    Current Facility-Administered Medications:   •  pneumococcal polysaccharide 23-valent (PNEUMOVAX-23) vaccine 0.5 mL, 0.5 mL, Intramuscular, Once, Monica Tomlinson, LORENA      Immunization History   Administered Date(s) Administered   • FLUAD TRI 65YR+ 09/10/2018   • Influenza, Unspecified 09/10/2018   • Pneumococcal Conjugate 13-Valent (PCV13) 03/09/2020   • Pneumococcal Polysaccharide (PPSV23) 05/11/2009   • Tdap 05/11/2009         Objective       Vitals:    03/10/22 1343   BP: 141/57   Pulse: 62   Temp: 98 °F (36.7 °C)   SpO2: 97%   Weight: 57.2 kg (126 lb)   Height: 154.9 cm (61\")      Body mass index is 23.81 kg/m².   Wt Readings from Last 3 Encounters:   03/10/22 57.2 kg (126 lb)   09/09/21 57.1 kg (125 lb 12.8 oz)   03/10/21 56.7 kg (125 lb)      BP Readings from Last 3 Encounters:   03/10/22 141/57   09/09/21 136/63   03/10/21 140/92        Physical Exam  Vitals reviewed.   Constitutional:       Appearance: Normal appearance. She is well-developed.   HENT:      Head: Normocephalic and atraumatic.   Eyes:      Conjunctiva/sclera: Conjunctivae normal.      Pupils: Pupils are equal, round, and reactive to light.   Cardiovascular:      Rate and Rhythm: Normal rate and regular rhythm.      Heart sounds: Normal heart sounds. No murmur heard.  Pulmonary:      Effort: Pulmonary effort is normal.      Breath sounds: Normal breath sounds. No wheezing or rhonchi.   Abdominal:      General: Bowel sounds are normal. There is no distension.      Palpations: Abdomen is soft.      Tenderness: There is no abdominal tenderness.   Skin:     General: Skin is warm and dry.   Neurological: "      Mental Status: She is alert and oriented to person, place, and time.   Psychiatric:         Mood and Affect: Mood and affect normal.         Behavior: Behavior normal.         Thought Content: Thought content normal.         Judgment: Judgment normal.             Result Review :     Common labs    Common Labsle 9/17/21 9/17/21 9/17/21 9/17/21    0958 0958 0958 0958   Glucose    167 (A)   BUN    17   Creatinine    0.74   eGFR Non African Am    77   Sodium    139   Potassium    4.5   Chloride    102   Calcium    10.1   Albumin    4.20   Total Bilirubin    0.5   Alkaline Phosphatase    88   AST (SGOT)    23   ALT (SGPT)    23   Total Cholesterol   137    Triglycerides   80    HDL Cholesterol   46    LDL Cholesterol    75    Hemoglobin A1C 7.39 (A)      Microalbumin, Urine  <1.2     (A) Abnormal value                           Assessment and Plan        Diagnoses and all orders for this visit:    1. Controlled type 2 diabetes mellitus without complication, with long-term current use of insulin (HCC) (Primary)  -     glipizide (GLUCOTROL) 5 MG tablet; Take 1 tablet by mouth 2 (Two) Times a Day Before Meals.  Dispense: 180 tablet; Refill: 1  -     Jardiance 10 MG tablet tablet; Take 1 tablet by mouth Daily.  Dispense: 90 tablet; Refill: 1  -     Comprehensive Metabolic Panel  -     Lipid Panel  -     Hemoglobin A1c    2. Essential hypertension  -     atenolol (TENORMIN) 50 MG tablet; Take 1 tablet by mouth 2 (Two) Times a Day.  Dispense: 180 tablet; Refill: 1  -     losartan (COZAAR) 100 MG tablet; Take 1 tablet by mouth Daily.  Dispense: 90 tablet; Refill: 1    3. Mixed hyperlipidemia    4. Hypothyroidism due to Hashimoto's thyroiditis  -     levothyroxine (SYNTHROID, LEVOTHROID) 50 MCG tablet; Take 1 tablet by mouth Every Morning.  Dispense: 90 tablet; Refill: 1  -     TSH    5. Primary insomnia  -     hydrOXYzine (ATARAX) 25 MG tablet; Take 1 tablet by mouth At Night As Needed for Anxiety.  Dispense: 30 tablet;  Refill: 1    6. Intractable migraine with aura without status migrainosus  -     rizatriptan (MAXALT) 10 MG tablet; Take 1 tablet by mouth 1 (One) Time As Needed for Migraine. May repeat in 2 hours if needed  Dispense: 12 tablet; Refill: 1    7. Vitamin D deficiency  -     Vitamin D 25 Hydroxy    8. Need for pneumococcal vaccination  -     pneumococcal polysaccharide 23-valent (PNEUMOVAX-23) vaccine 0.5 mL          Follow Up     Return in about 6 months (around 9/10/2022) for Next scheduled follow up.    Patient was given instructions and counseling regarding her condition or for health maintenance advice. Please see specific information pulled into the AVS if appropriate.     LORENA Darden

## 2022-06-08 DIAGNOSIS — F51.01 PRIMARY INSOMNIA: ICD-10-CM

## 2022-06-08 RX ORDER — HYDROXYZINE HYDROCHLORIDE 25 MG/1
25 TABLET, FILM COATED ORAL NIGHTLY PRN
Qty: 30 TABLET | Refills: 1 | Status: SHIPPED | OUTPATIENT
Start: 2022-06-08 | End: 2023-03-20

## 2022-09-02 NOTE — PROGRESS NOTES
Chief Complaint  Hypertension and Diabetes    Subjective          Krissy Castillo is a 77 y.o. female who presents to [facility]    History of Present Illness    HTN - mild elevation. Pt reports compliance with med. Pt reports white coat syndrome.     DM - pt reports making dietary changes. Pt refused statin therapy.  15 lb weight loss with diet modifications.     Migraines - have resolved since March.     Hypothyroid - pt reports compliance with med and energy level is good.     PHQ-2 Total Score: 0   PHQ-9 Total Score: 0        Review of Systems   Constitutional: Negative for fatigue.   Respiratory: Negative for cough and shortness of breath.    Cardiovascular: Negative for chest pain and palpitations.   Gastrointestinal: Negative for nausea and vomiting.   Endocrine: Negative for cold intolerance and heat intolerance.   Genitourinary: Negative for difficulty urinating and dysuria.   Musculoskeletal: Negative for arthralgias, gait problem and joint swelling.   Neurological: Negative for dizziness, seizures and headaches.   Hematological: Negative for adenopathy. Does not bruise/bleed easily.   Psychiatric/Behavioral: Negative for confusion, dysphoric mood and sleep disturbance. The patient is not nervous/anxious.           Medical History: has a past medical history of Arthritis, Constipated, Diabetes mellitus type II, controlled (HCC), Diverticular disease of colon, Gastric ulcer, Glaucoma, Heel pain, Hyperlipidemia, Hypertension, Hypothyroidism, Migraine with aura, Osteoporosis, Spinal stenosis, Vitamin B12 deficiency, and Vitamin D deficiency.     Surgical History: has a past surgical history that includes Hysterectomy; Adenoidectomy; Cholecystectomy; Colonoscopy (2016); Dilation and curettage of uterus; Hysterectomy; and Tonsillectomy.     Family History: family history includes Breast cancer in her mother; Diabetes in an other family member; Heart disease in her brother,  father, mother, and other family members; Stomach cancer in an other family member; Stroke in her mother and sister.     Social History: reports that she has never smoked. She has never used smokeless tobacco. She reports that she does not drink alcohol and does not use drugs.    Allergies: Metformin      Health Maintenance Due   Topic Date Due   • ZOSTER VACCINE (1 of 2) Never done   • HEPATITIS C SCREENING  Never done   • DXA SCAN  11/08/2021   • DIABETIC EYE EXAM  07/08/2022   • COVID-19 Vaccine (2 - Booster for Roberta series) 07/15/2022   • ANNUAL WELLNESS VISIT  09/09/2022   • HEMOGLOBIN A1C  09/10/2022            Current Outpatient Medications:   •  atenolol (TENORMIN) 50 MG tablet, Take 1 tablet by mouth 2 (Two) Times a Day., Disp: 180 tablet, Rfl: 1  •  Calcium Carb-Cholecalciferol (Caltrate 600+D3 Soft) 600-800 MG-UNIT chewable tablet, , Disp: , Rfl:   •  calcium polycarbophil (FiberCon) 625 MG tablet, , Disp: , Rfl:   •  cholecalciferol (VITAMIN D3) 25 MCG (1000 UT) tablet, Take 1 tablet by mouth., Disp: , Rfl:   •  Cobalamin Combinations (Vitamin B12-Folic Acid) 500-400 MCG tablet, , Disp: , Rfl:   •  glipizide (GLUCOTROL) 5 MG tablet, Take 1 tablet by mouth 2 (Two) Times a Day Before Meals., Disp: 180 tablet, Rfl: 1  •  Jardiance 10 MG tablet tablet, Take 1 tablet by mouth Daily., Disp: 90 tablet, Rfl: 1  •  levothyroxine (SYNTHROID, LEVOTHROID) 50 MCG tablet, Take 1 tablet by mouth Every Morning., Disp: 90 tablet, Rfl: 1  •  losartan (COZAAR) 100 MG tablet, Take 1 tablet by mouth Daily., Disp: 90 tablet, Rfl: 1  •  SITagliptin (Januvia) 100 MG tablet, Take 1 tablet by mouth Daily., Disp: 90 tablet, Rfl: 1  •  hydrOXYzine (ATARAX) 25 MG tablet, Take 1 tablet by mouth At Night As Needed for Anxiety., Disp: 30 tablet, Rfl: 1  •  ibuprofen (ADVIL,MOTRIN) 600 MG tablet, ibuprofen 600 mg oral tablet take 1 tablet (600 mg) by oral route 3 times per day with food   Active, Disp: , Rfl:   •  meclizine  (ANTIVERT) 12.5 MG tablet, meclizine 12.5 mg oral tablet take 2 tablets (25 mg) by oral route 2 times per day as needed   Active, Disp: , Rfl:   •  rizatriptan (MAXALT) 10 MG tablet, Take 1 tablet by mouth 1 (One) Time As Needed for Migraine. May repeat in 2 hours if needed, Disp: 12 tablet, Rfl: 1      Immunization History   Administered Date(s) Administered   • COVID-19 (PAT) 05/20/2022   • FLUAD TRI 65YR+ 09/10/2018   • Influenza, Unspecified 09/10/2018   • Pneumococcal Conjugate 13-Valent (PCV13) 03/09/2020   • Pneumococcal Polysaccharide (PPSV23) 05/11/2009, 03/10/2022   • Tdap 05/11/2009         Objective       Vitals:    09/12/22 1455   BP: 149/43   Pulse: 55   Temp: 98 °F (36.7 °C)   TempSrc: Temporal   SpO2: 97%   Weight: 50.7 kg (111 lb 12.8 oz)      Body mass index is 21.12 kg/m².   Wt Readings from Last 3 Encounters:   09/12/22 50.7 kg (111 lb 12.8 oz)   03/10/22 57.2 kg (126 lb)   09/09/21 57.1 kg (125 lb 12.8 oz)      BP Readings from Last 3 Encounters:   09/12/22 149/43   03/10/22 141/57   09/09/21 136/63        Physical Exam  Vitals reviewed.   Constitutional:       Appearance: Normal appearance. She is well-developed.   HENT:      Head: Normocephalic and atraumatic.   Eyes:      Conjunctiva/sclera: Conjunctivae normal.      Pupils: Pupils are equal, round, and reactive to light.   Cardiovascular:      Rate and Rhythm: Normal rate and regular rhythm.      Heart sounds: Normal heart sounds. No murmur heard.  Pulmonary:      Effort: Pulmonary effort is normal.      Breath sounds: Normal breath sounds. No wheezing or rhonchi.   Abdominal:      General: Bowel sounds are normal. There is no distension.      Palpations: Abdomen is soft.      Tenderness: There is no abdominal tenderness.   Skin:     General: Skin is warm and dry.   Neurological:      Mental Status: She is alert and oriented to person, place, and time.   Psychiatric:         Mood and Affect: Mood and affect normal.         Behavior:  Behavior normal.         Thought Content: Thought content normal.         Judgment: Judgment normal.             Result Review :       Common labs    Common Labsle 3/10/22 3/10/22 3/10/22    1408 1408 1408   Glucose  207 (A)    BUN  13    Creatinine  0.95    Sodium  141    Potassium  4.7    Chloride  104    Calcium  10.7 (A)    Albumin  4.50    Total Bilirubin  0.7    Alkaline Phosphatase  89    AST (SGOT)  20    ALT (SGPT)  26    Total Cholesterol 142     Triglycerides 181 (A)     HDL Cholesterol 51     LDL Cholesterol  61     Hemoglobin A1C   7.80 (A)   (A) Abnormal value                             Assessment and Plan        Diagnoses and all orders for this visit:    1. Controlled type 2 diabetes mellitus without complication, with long-term current use of insulin (HCC) (Primary)  -     SITagliptin (Januvia) 100 MG tablet; Take 1 tablet by mouth Daily.  Dispense: 90 tablet; Refill: 1  -     Comprehensive Metabolic Panel  -     Lipid Panel  -     Hemoglobin A1c  -     Microalbumin / Creatinine Urine Ratio - Urine, Clean Catch    2. Benign essential hypertension  Comments:  Continue medication and monitor b/p at home.     3. Mixed hyperlipidemia  Comments:  pt aware risk of no statin therapy.     4. Hypothyroidism, unspecified type  -     TSH    5. Postmenopause  -     DEXA Bone Density Axial; Future    6. Visit for screening mammogram  -     Mammo Screening Digital Tomosynthesis Bilateral With CAD; Future      Patient advised to monitor blood pressure at home and journal readings.  Patient informed that a blood pressure reading at home of more than 130/80 is considered hypertension.  For readings greater than 140/90 or higher patient is advised to follow-up in the office with readings for management.  Patient advised to limit sodium intake.      Follow Up     Return in about 6 months (around 3/12/2023) for Medicare Wellness.    Patient was given instructions and counseling regarding her condition or for health  maintenance advice. Please see specific information pulled into the AVS if appropriate.     LORENA Darden

## 2022-09-06 DIAGNOSIS — I10 ESSENTIAL HYPERTENSION: ICD-10-CM

## 2022-09-06 DIAGNOSIS — E11.9 CONTROLLED TYPE 2 DIABETES MELLITUS WITHOUT COMPLICATION, WITH LONG-TERM CURRENT USE OF INSULIN: ICD-10-CM

## 2022-09-06 DIAGNOSIS — E03.8 HYPOTHYROIDISM DUE TO HASHIMOTO'S THYROIDITIS: ICD-10-CM

## 2022-09-06 DIAGNOSIS — Z79.4 CONTROLLED TYPE 2 DIABETES MELLITUS WITHOUT COMPLICATION, WITH LONG-TERM CURRENT USE OF INSULIN: ICD-10-CM

## 2022-09-06 DIAGNOSIS — E06.3 HYPOTHYROIDISM DUE TO HASHIMOTO'S THYROIDITIS: ICD-10-CM

## 2022-09-06 RX ORDER — GLIPIZIDE 5 MG/1
5 TABLET ORAL
Qty: 180 TABLET | Refills: 1 | Status: SHIPPED | OUTPATIENT
Start: 2022-09-06 | End: 2023-03-20 | Stop reason: SDUPTHER

## 2022-09-06 RX ORDER — LEVOTHYROXINE SODIUM 0.05 MG/1
50 TABLET ORAL
Qty: 90 TABLET | Refills: 1 | Status: SHIPPED | OUTPATIENT
Start: 2022-09-06 | End: 2023-03-20 | Stop reason: SDUPTHER

## 2022-09-06 RX ORDER — ATENOLOL 50 MG/1
50 TABLET ORAL 2 TIMES DAILY
Qty: 180 TABLET | Refills: 1 | Status: SHIPPED | OUTPATIENT
Start: 2022-09-06 | End: 2023-03-20 | Stop reason: SDUPTHER

## 2022-09-06 RX ORDER — LOSARTAN POTASSIUM 100 MG/1
100 TABLET ORAL DAILY
Qty: 90 TABLET | Refills: 1 | Status: SHIPPED | OUTPATIENT
Start: 2022-09-06 | End: 2023-03-20 | Stop reason: SDUPTHER

## 2022-09-06 RX ORDER — EMPAGLIFLOZIN 10 MG/1
10 TABLET, FILM COATED ORAL DAILY
Qty: 90 TABLET | Refills: 1 | Status: SHIPPED | OUTPATIENT
Start: 2022-09-06 | End: 2023-03-20 | Stop reason: SDUPTHER

## 2022-09-12 ENCOUNTER — OFFICE VISIT (OUTPATIENT)
Dept: FAMILY MEDICINE CLINIC | Facility: CLINIC | Age: 77
End: 2022-09-12

## 2022-09-12 VITALS
OXYGEN SATURATION: 97 % | WEIGHT: 111.8 LBS | HEART RATE: 55 BPM | SYSTOLIC BLOOD PRESSURE: 149 MMHG | TEMPERATURE: 98 F | DIASTOLIC BLOOD PRESSURE: 43 MMHG | BODY MASS INDEX: 21.12 KG/M2

## 2022-09-12 DIAGNOSIS — E78.2 MIXED HYPERLIPIDEMIA: ICD-10-CM

## 2022-09-12 DIAGNOSIS — E03.9 HYPOTHYROIDISM, UNSPECIFIED TYPE: ICD-10-CM

## 2022-09-12 DIAGNOSIS — E11.9 CONTROLLED TYPE 2 DIABETES MELLITUS WITHOUT COMPLICATION, WITH LONG-TERM CURRENT USE OF INSULIN: Primary | ICD-10-CM

## 2022-09-12 DIAGNOSIS — Z12.31 VISIT FOR SCREENING MAMMOGRAM: ICD-10-CM

## 2022-09-12 DIAGNOSIS — Z79.4 CONTROLLED TYPE 2 DIABETES MELLITUS WITHOUT COMPLICATION, WITH LONG-TERM CURRENT USE OF INSULIN: Primary | ICD-10-CM

## 2022-09-12 DIAGNOSIS — Z78.0 POSTMENOPAUSE: ICD-10-CM

## 2022-09-12 DIAGNOSIS — I10 BENIGN ESSENTIAL HYPERTENSION: ICD-10-CM

## 2022-09-12 LAB
ALBUMIN UR-MCNC: <1.2 MG/DL
CREAT UR-MCNC: 71.8 MG/DL
HBA1C MFR BLD: 5.9 % (ref 4.8–5.6)
MICROALBUMIN/CREAT UR: NORMAL MG/G{CREAT}

## 2022-09-12 PROCEDURE — 80061 LIPID PANEL: CPT | Performed by: NURSE PRACTITIONER

## 2022-09-12 PROCEDURE — 82570 ASSAY OF URINE CREATININE: CPT | Performed by: NURSE PRACTITIONER

## 2022-09-12 PROCEDURE — 99214 OFFICE O/P EST MOD 30 MIN: CPT | Performed by: NURSE PRACTITIONER

## 2022-09-12 PROCEDURE — 83036 HEMOGLOBIN GLYCOSYLATED A1C: CPT | Performed by: NURSE PRACTITIONER

## 2022-09-12 PROCEDURE — 84443 ASSAY THYROID STIM HORMONE: CPT | Performed by: NURSE PRACTITIONER

## 2022-09-12 PROCEDURE — 36415 COLL VENOUS BLD VENIPUNCTURE: CPT | Performed by: NURSE PRACTITIONER

## 2022-09-12 PROCEDURE — 80053 COMPREHEN METABOLIC PANEL: CPT | Performed by: NURSE PRACTITIONER

## 2022-09-12 PROCEDURE — 82043 UR ALBUMIN QUANTITATIVE: CPT | Performed by: NURSE PRACTITIONER

## 2022-09-13 LAB
ALBUMIN SERPL-MCNC: 4.3 G/DL (ref 3.5–5.2)
ALBUMIN/GLOB SERPL: 1.7 G/DL
ALP SERPL-CCNC: 71 U/L (ref 39–117)
ALT SERPL W P-5'-P-CCNC: 15 U/L (ref 1–33)
ANION GAP SERPL CALCULATED.3IONS-SCNC: 9.6 MMOL/L (ref 5–15)
AST SERPL-CCNC: 19 U/L (ref 1–32)
BILIRUB SERPL-MCNC: 0.5 MG/DL (ref 0–1.2)
BUN SERPL-MCNC: 20 MG/DL (ref 8–23)
BUN/CREAT SERPL: 26.3 (ref 7–25)
CALCIUM SPEC-SCNC: 10.5 MG/DL (ref 8.6–10.5)
CHLORIDE SERPL-SCNC: 107 MMOL/L (ref 98–107)
CHOLEST SERPL-MCNC: 179 MG/DL (ref 0–200)
CO2 SERPL-SCNC: 24.4 MMOL/L (ref 22–29)
CREAT SERPL-MCNC: 0.76 MG/DL (ref 0.57–1)
EGFRCR SERPLBLD CKD-EPI 2021: 81.3 ML/MIN/1.73
GLOBULIN UR ELPH-MCNC: 2.5 GM/DL
GLUCOSE SERPL-MCNC: 108 MG/DL (ref 65–99)
HDLC SERPL-MCNC: 52 MG/DL (ref 40–60)
LDLC SERPL CALC-MCNC: 105 MG/DL (ref 0–100)
LDLC/HDLC SERPL: 1.97 {RATIO}
POTASSIUM SERPL-SCNC: 4.6 MMOL/L (ref 3.5–5.2)
PROT SERPL-MCNC: 6.8 G/DL (ref 6–8.5)
SODIUM SERPL-SCNC: 141 MMOL/L (ref 136–145)
TRIGL SERPL-MCNC: 124 MG/DL (ref 0–150)
TSH SERPL DL<=0.05 MIU/L-ACNC: 2.25 UIU/ML (ref 0.27–4.2)
VLDLC SERPL-MCNC: 22 MG/DL (ref 5–40)

## 2022-12-06 ENCOUNTER — APPOINTMENT (OUTPATIENT)
Dept: MAMMOGRAPHY | Facility: HOSPITAL | Age: 77
End: 2022-12-06

## 2022-12-06 ENCOUNTER — APPOINTMENT (OUTPATIENT)
Dept: BONE DENSITY | Facility: HOSPITAL | Age: 77
End: 2022-12-06

## 2023-03-10 NOTE — PROGRESS NOTES
Chief Complaint  Diabetes (Follow up)    Subjective          Krissy Castillo is a 78 y.o. female who presents to [facility]    History of Present Illness    Migraines-patient reports having a few headaches here recently with light sensitivity and nausea however she was able to take Maxalt with relief.    Hypertension-patient reports when she checks her blood pressure at home its 138/85.  Slight elevation noted today in office patient denies any symptoms thereof.    Hypothyroid-patient is compliant with medication denies fatigue.    Diabetes-patient reports blood sugar runs 131 and occasionally 154.  Patient reports compliance with medications.  Patient denies any low blood sugar    PHQ-2 Total Score: 0   PHQ-9 Total Score: 0        Review of Systems   Constitutional: Negative for fatigue.   Cardiovascular: Negative for chest pain.   Endocrine: Negative for polydipsia, polyphagia and polyuria.   Skin: Negative for pallor.   Neurological: Positive for headaches. Negative for dizziness, tremors, seizures, speech difficulty and weakness.   Psychiatric/Behavioral: Negative for confusion. The patient is not nervous/anxious.           Medical History: has a past medical history of Arthritis, Constipated, Diabetes mellitus type II, controlled (HCC), Diverticular disease of colon, Gastric ulcer, Glaucoma, Heel pain, Hyperlipidemia, Hypertension, Hypothyroidism, Migraine with aura, Osteoporosis, Spinal stenosis, Vitamin B12 deficiency, and Vitamin D deficiency.     Surgical History: has a past surgical history that includes Hysterectomy; Adenoidectomy; Cholecystectomy; Colonoscopy (2016); Dilation and curettage of uterus; Hysterectomy; and Tonsillectomy.     Family History: family history includes Breast cancer in her mother; Diabetes in an other family member; Heart disease in her brother, father, mother, and other family members; Stomach cancer in an other family member; Stroke in her  mother and sister.     Social History: reports that she has never smoked. She has never used smokeless tobacco. She reports that she does not drink alcohol and does not use drugs.    Allergies: Metformin      Health Maintenance Due   Topic Date Due   • ZOSTER VACCINE (1 of 2) Never done   • TDAP/TD VACCINES (2 - Td or Tdap) 05/11/2019   • HEPATITIS C SCREENING  Never done   • DXA SCAN  11/08/2021   • DIABETIC EYE EXAM  07/08/2022   • COVID-19 Vaccine (2 - Booster for Roberta series) 07/15/2022   • INFLUENZA VACCINE  08/01/2022   • ANNUAL WELLNESS VISIT  09/09/2022   • HEMOGLOBIN A1C  03/12/2023            Current Outpatient Medications:   •  atenolol (TENORMIN) 50 MG tablet, Take 1 tablet by mouth 2 (Two) Times a Day., Disp: 180 tablet, Rfl: 1  •  Calcium Carb-Cholecalciferol (Caltrate 600+D3 Soft) 600-800 MG-UNIT chewable tablet, , Disp: , Rfl:   •  calcium polycarbophil (FiberCon) 625 MG tablet, , Disp: , Rfl:   •  cholecalciferol (VITAMIN D3) 25 MCG (1000 UT) tablet, Take 1 tablet by mouth., Disp: , Rfl:   •  Cobalamin Combinations (Vitamin B12-Folic Acid) 500-400 MCG tablet, , Disp: , Rfl:   •  glipizide (GLUCOTROL) 5 MG tablet, Take 1 tablet by mouth 2 (Two) Times a Day Before Meals., Disp: 180 tablet, Rfl: 1  •  ibuprofen (ADVIL,MOTRIN) 600 MG tablet, ibuprofen 600 mg oral tablet take 1 tablet (600 mg) by oral route 3 times per day with food   Active, Disp: , Rfl:   •  Jardiance 10 MG tablet tablet, Take 1 tablet by mouth Daily., Disp: 90 tablet, Rfl: 1  •  levothyroxine (SYNTHROID, LEVOTHROID) 50 MCG tablet, Take 1 tablet by mouth Every Morning., Disp: 90 tablet, Rfl: 1  •  losartan (COZAAR) 100 MG tablet, Take 1 tablet by mouth Daily., Disp: 90 tablet, Rfl: 1  •  meclizine (ANTIVERT) 12.5 MG tablet, meclizine 12.5 mg oral tablet take 2 tablets (25 mg) by oral route 2 times per day as needed   Active, Disp: , Rfl:   •  Multiple Vitamins-Minerals (ZINC PO), Take  by mouth., Disp: , Rfl:   •  rizatriptan  (MAXALT) 10 MG tablet, Take 1 tablet by mouth 1 (One) Time As Needed for Migraine. May repeat in 2 hours if needed, Disp: 12 tablet, Rfl: 1      Immunization History   Administered Date(s) Administered   • COVID-19 (PTA) 05/20/2022   • FLUAD TRI 65YR+ 09/10/2018   • Influenza, Unspecified 09/10/2018   • Pneumococcal Conjugate 13-Valent (PCV13) 03/09/2020   • Pneumococcal Polysaccharide (PPSV23) 05/11/2009, 03/10/2022   • Tdap 05/11/2009         Objective       Vitals:    03/20/23 1044 03/20/23 1050   BP: 156/64 158/61   Pulse: (!) 49    Temp: 98.7 °F (37.1 °C)    TempSrc: Temporal    SpO2: 98%    Weight: 54 kg (119 lb)       Body mass index is 22.48 kg/m².   Wt Readings from Last 3 Encounters:   03/20/23 54 kg (119 lb)   09/12/22 50.7 kg (111 lb 12.8 oz)   03/10/22 57.2 kg (126 lb)      BP Readings from Last 3 Encounters:   03/20/23 158/61   09/12/22 149/43   03/10/22 141/57        Physical Exam  Vitals reviewed.   Constitutional:       Appearance: Normal appearance. She is well-developed.   HENT:      Head: Normocephalic and atraumatic.   Eyes:      Conjunctiva/sclera: Conjunctivae normal.      Pupils: Pupils are equal, round, and reactive to light.   Cardiovascular:      Rate and Rhythm: Regular rhythm. Bradycardia present.      Heart sounds: Normal heart sounds. No murmur heard.  Pulmonary:      Effort: Pulmonary effort is normal.      Breath sounds: Normal breath sounds. No wheezing or rhonchi.   Abdominal:      General: Bowel sounds are normal. There is no distension.      Palpations: Abdomen is soft.      Tenderness: There is no abdominal tenderness.   Musculoskeletal:      Right lower leg: No edema.      Left lower leg: No edema.   Skin:     General: Skin is warm and dry.   Neurological:      Mental Status: She is alert and oriented to person, place, and time.   Psychiatric:         Mood and Affect: Mood and affect normal.         Behavior: Behavior normal.         Thought Content: Thought content normal.          Judgment: Judgment normal.             Result Review :       Common labs    Common Labs 9/12/22 9/12/22 9/12/22 9/12/22    1545 1545 1545 1545   Glucose   108 (A)    BUN   20    Creatinine   0.76    Sodium   141    Potassium   4.6    Chloride   107    Calcium   10.5    Albumin   4.30    Total Bilirubin   0.5    Alkaline Phosphatase   71    AST (SGOT)   19    ALT (SGPT)   15    Total Cholesterol    179   Triglycerides    124   HDL Cholesterol    52   LDL Cholesterol     105 (A)   Hemoglobin A1C 5.90 (A)      Microalbumin, Urine  <1.2     (A) Abnormal value                             Assessment and Plan        Diagnoses and all orders for this visit:    1. Hypothyroidism due to Hashimoto's thyroiditis (Primary)  -     TSH  -     levothyroxine (SYNTHROID, LEVOTHROID) 50 MCG tablet; Take 1 tablet by mouth Every Morning.  Dispense: 90 tablet; Refill: 1    2. Controlled type 2 diabetes mellitus without complication, with long-term current use of insulin (HCC)  -     Comprehensive Metabolic Panel  -     Lipid Panel  -     Hemoglobin A1c  -     Jardiance 10 MG tablet tablet; Take 1 tablet by mouth Daily.  Dispense: 90 tablet; Refill: 1  -     glipizide (GLUCOTROL) 5 MG tablet; Take 1 tablet by mouth 2 (Two) Times a Day Before Meals.  Dispense: 180 tablet; Refill: 1    3. Need for hepatitis C screening test  -     Hepatitis C Antibody    4. Postmenopause  -     DEXA Bone Density Axial; Future    5. Essential hypertension  -     losartan (COZAAR) 100 MG tablet; Take 1 tablet by mouth Daily.  Dispense: 90 tablet; Refill: 1  -     atenolol (TENORMIN) 50 MG tablet; Take 1 tablet by mouth 2 (Two) Times a Day.  Dispense: 180 tablet; Refill: 1    6. Intractable migraine with aura without status migrainosus  -     rizatriptan (MAXALT) 10 MG tablet; Take 1 tablet by mouth 1 (One) Time As Needed for Migraine. May repeat in 2 hours if needed  Dispense: 12 tablet; Refill: 1      Continue blood sugar monitoring daily and record.   Bring your log to office visits.  Call the office for readings below 70 and above 250 or any complications.    Daily foot care.  Avoid walking barefoot.    Annual dilated eye exam.    Discussed with patient blood pressure monitoring, hemoglobin A1c levels need to be below 7, and LDL goals below 70.      Follow Up     Return in about 6 months (around 9/20/2023) for Next scheduled follow up.    Patient was given instructions and counseling regarding her condition or for health maintenance advice. Please see specific information pulled into the AVS if appropriate.     LORENA Darden

## 2023-03-20 ENCOUNTER — OFFICE VISIT (OUTPATIENT)
Dept: FAMILY MEDICINE CLINIC | Facility: CLINIC | Age: 78
End: 2023-03-20
Payer: MEDICARE

## 2023-03-20 VITALS
WEIGHT: 119 LBS | DIASTOLIC BLOOD PRESSURE: 61 MMHG | HEART RATE: 49 BPM | BODY MASS INDEX: 22.48 KG/M2 | TEMPERATURE: 98.7 F | OXYGEN SATURATION: 98 % | SYSTOLIC BLOOD PRESSURE: 158 MMHG

## 2023-03-20 DIAGNOSIS — E03.8 HYPOTHYROIDISM DUE TO HASHIMOTO'S THYROIDITIS: Primary | ICD-10-CM

## 2023-03-20 DIAGNOSIS — Z78.0 POSTMENOPAUSE: ICD-10-CM

## 2023-03-20 DIAGNOSIS — G43.119 INTRACTABLE MIGRAINE WITH AURA WITHOUT STATUS MIGRAINOSUS: ICD-10-CM

## 2023-03-20 DIAGNOSIS — Z79.4 CONTROLLED TYPE 2 DIABETES MELLITUS WITHOUT COMPLICATION, WITH LONG-TERM CURRENT USE OF INSULIN: ICD-10-CM

## 2023-03-20 DIAGNOSIS — E11.9 CONTROLLED TYPE 2 DIABETES MELLITUS WITHOUT COMPLICATION, WITH LONG-TERM CURRENT USE OF INSULIN: ICD-10-CM

## 2023-03-20 DIAGNOSIS — E06.3 HYPOTHYROIDISM DUE TO HASHIMOTO'S THYROIDITIS: Primary | ICD-10-CM

## 2023-03-20 DIAGNOSIS — Z11.59 NEED FOR HEPATITIS C SCREENING TEST: ICD-10-CM

## 2023-03-20 DIAGNOSIS — I10 ESSENTIAL HYPERTENSION: ICD-10-CM

## 2023-03-20 LAB
ALBUMIN SERPL-MCNC: 4.6 G/DL (ref 3.5–5.2)
ALBUMIN/GLOB SERPL: 1.8 G/DL
ALP SERPL-CCNC: 81 U/L (ref 39–117)
ALT SERPL W P-5'-P-CCNC: 13 U/L (ref 1–33)
ANION GAP SERPL CALCULATED.3IONS-SCNC: 12.4 MMOL/L (ref 5–15)
AST SERPL-CCNC: 16 U/L (ref 1–32)
BILIRUB SERPL-MCNC: 0.7 MG/DL (ref 0–1.2)
BUN SERPL-MCNC: 20 MG/DL (ref 8–23)
BUN/CREAT SERPL: 21.7 (ref 7–25)
CALCIUM SPEC-SCNC: 11.2 MG/DL (ref 8.6–10.5)
CHLORIDE SERPL-SCNC: 100 MMOL/L (ref 98–107)
CHOLEST SERPL-MCNC: 216 MG/DL (ref 0–200)
CO2 SERPL-SCNC: 26.6 MMOL/L (ref 22–29)
CREAT SERPL-MCNC: 0.92 MG/DL (ref 0.57–1)
EGFRCR SERPLBLD CKD-EPI 2021: 64.3 ML/MIN/1.73
GLOBULIN UR ELPH-MCNC: 2.6 GM/DL
GLUCOSE SERPL-MCNC: 128 MG/DL (ref 65–99)
HBA1C MFR BLD: 6.1 % (ref 4.8–5.6)
HCV AB SER DONR QL: NORMAL
HDLC SERPL-MCNC: 60 MG/DL (ref 40–60)
LDLC SERPL CALC-MCNC: 142 MG/DL (ref 0–100)
LDLC/HDLC SERPL: 2.34 {RATIO}
POTASSIUM SERPL-SCNC: 5.1 MMOL/L (ref 3.5–5.2)
PROT SERPL-MCNC: 7.2 G/DL (ref 6–8.5)
SODIUM SERPL-SCNC: 139 MMOL/L (ref 136–145)
TRIGL SERPL-MCNC: 78 MG/DL (ref 0–150)
TSH SERPL DL<=0.05 MIU/L-ACNC: 1.44 UIU/ML (ref 0.27–4.2)
VLDLC SERPL-MCNC: 14 MG/DL (ref 5–40)

## 2023-03-20 PROCEDURE — 99214 OFFICE O/P EST MOD 30 MIN: CPT | Performed by: NURSE PRACTITIONER

## 2023-03-20 PROCEDURE — 3078F DIAST BP <80 MM HG: CPT | Performed by: NURSE PRACTITIONER

## 2023-03-20 PROCEDURE — 80053 COMPREHEN METABOLIC PANEL: CPT | Performed by: NURSE PRACTITIONER

## 2023-03-20 PROCEDURE — 84443 ASSAY THYROID STIM HORMONE: CPT | Performed by: NURSE PRACTITIONER

## 2023-03-20 PROCEDURE — 80061 LIPID PANEL: CPT | Performed by: NURSE PRACTITIONER

## 2023-03-20 PROCEDURE — 86803 HEPATITIS C AB TEST: CPT | Performed by: NURSE PRACTITIONER

## 2023-03-20 PROCEDURE — 36415 COLL VENOUS BLD VENIPUNCTURE: CPT | Performed by: NURSE PRACTITIONER

## 2023-03-20 PROCEDURE — 1159F MED LIST DOCD IN RCRD: CPT | Performed by: NURSE PRACTITIONER

## 2023-03-20 PROCEDURE — 3077F SYST BP >= 140 MM HG: CPT | Performed by: NURSE PRACTITIONER

## 2023-03-20 PROCEDURE — 83036 HEMOGLOBIN GLYCOSYLATED A1C: CPT | Performed by: NURSE PRACTITIONER

## 2023-03-20 RX ORDER — RIZATRIPTAN BENZOATE 10 MG/1
10 TABLET ORAL ONCE AS NEEDED
Qty: 12 TABLET | Refills: 1 | Status: SHIPPED | OUTPATIENT
Start: 2023-03-20

## 2023-03-20 RX ORDER — EMPAGLIFLOZIN 10 MG/1
10 TABLET, FILM COATED ORAL DAILY
Qty: 90 TABLET | Refills: 1 | Status: SHIPPED | OUTPATIENT
Start: 2023-03-20

## 2023-03-20 RX ORDER — ATENOLOL 50 MG/1
50 TABLET ORAL 2 TIMES DAILY
Qty: 180 TABLET | Refills: 1 | Status: SHIPPED | OUTPATIENT
Start: 2023-03-20

## 2023-03-20 RX ORDER — GLIPIZIDE 5 MG/1
5 TABLET ORAL
Qty: 180 TABLET | Refills: 1 | Status: SHIPPED | OUTPATIENT
Start: 2023-03-20

## 2023-03-20 RX ORDER — LEVOTHYROXINE SODIUM 0.05 MG/1
50 TABLET ORAL
Qty: 90 TABLET | Refills: 1 | Status: SHIPPED | OUTPATIENT
Start: 2023-03-20

## 2023-03-20 RX ORDER — LOSARTAN POTASSIUM 100 MG/1
100 TABLET ORAL DAILY
Qty: 90 TABLET | Refills: 1 | Status: SHIPPED | OUTPATIENT
Start: 2023-03-20

## 2023-03-21 DIAGNOSIS — Z11.59 NEED FOR HEPATITIS C SCREENING TEST: Primary | ICD-10-CM

## 2023-03-21 DIAGNOSIS — E03.9 HYPOTHYROIDISM, UNSPECIFIED TYPE: ICD-10-CM

## 2023-09-15 ENCOUNTER — OFFICE VISIT (OUTPATIENT)
Dept: FAMILY MEDICINE CLINIC | Facility: CLINIC | Age: 78
End: 2023-09-15
Payer: MEDICARE

## 2023-09-15 VITALS
SYSTOLIC BLOOD PRESSURE: 137 MMHG | HEART RATE: 50 BPM | WEIGHT: 124.8 LBS | BODY MASS INDEX: 23.58 KG/M2 | TEMPERATURE: 96.6 F | DIASTOLIC BLOOD PRESSURE: 67 MMHG | OXYGEN SATURATION: 97 %

## 2023-09-15 DIAGNOSIS — E11.9 CONTROLLED TYPE 2 DIABETES MELLITUS WITHOUT COMPLICATION, WITH LONG-TERM CURRENT USE OF INSULIN: ICD-10-CM

## 2023-09-15 DIAGNOSIS — Z00.00 MEDICARE ANNUAL WELLNESS VISIT, SUBSEQUENT: Primary | ICD-10-CM

## 2023-09-15 DIAGNOSIS — Z79.4 CONTROLLED TYPE 2 DIABETES MELLITUS WITHOUT COMPLICATION, WITH LONG-TERM CURRENT USE OF INSULIN: ICD-10-CM

## 2023-09-15 DIAGNOSIS — I10 PRIMARY HYPERTENSION: ICD-10-CM

## 2023-09-15 DIAGNOSIS — I10 ESSENTIAL HYPERTENSION: ICD-10-CM

## 2023-09-15 DIAGNOSIS — E78.2 MIXED HYPERLIPIDEMIA: ICD-10-CM

## 2023-09-15 DIAGNOSIS — E03.9 HYPOTHYROIDISM, UNSPECIFIED TYPE: ICD-10-CM

## 2023-09-15 DIAGNOSIS — E03.8 HYPOTHYROIDISM DUE TO HASHIMOTO'S THYROIDITIS: ICD-10-CM

## 2023-09-15 DIAGNOSIS — E06.3 HYPOTHYROIDISM DUE TO HASHIMOTO'S THYROIDITIS: ICD-10-CM

## 2023-09-15 DIAGNOSIS — G43.119 INTRACTABLE MIGRAINE WITH AURA WITHOUT STATUS MIGRAINOSUS: ICD-10-CM

## 2023-09-15 RX ORDER — LEVOTHYROXINE SODIUM 0.05 MG/1
50 TABLET ORAL
Qty: 90 TABLET | Refills: 1 | Status: SHIPPED | OUTPATIENT
Start: 2023-09-15

## 2023-09-15 RX ORDER — ATENOLOL 50 MG/1
50 TABLET ORAL 2 TIMES DAILY
Qty: 180 TABLET | Refills: 1 | Status: SHIPPED | OUTPATIENT
Start: 2023-09-15

## 2023-09-15 RX ORDER — RIZATRIPTAN BENZOATE 10 MG/1
10 TABLET ORAL ONCE AS NEEDED
Qty: 12 TABLET | Refills: 1 | Status: SHIPPED | OUTPATIENT
Start: 2023-09-15

## 2023-09-15 RX ORDER — GLIPIZIDE 5 MG/1
5 TABLET ORAL
Qty: 180 TABLET | Refills: 1 | Status: SHIPPED | OUTPATIENT
Start: 2023-09-15

## 2023-09-15 RX ORDER — EMPAGLIFLOZIN 10 MG/1
10 TABLET, FILM COATED ORAL DAILY
Qty: 90 TABLET | Refills: 1 | Status: SHIPPED | OUTPATIENT
Start: 2023-09-15

## 2023-09-15 RX ORDER — LOSARTAN POTASSIUM 100 MG/1
100 TABLET ORAL DAILY
Qty: 90 TABLET | Refills: 1 | Status: SHIPPED | OUTPATIENT
Start: 2023-09-15

## 2023-09-15 NOTE — PROGRESS NOTES
The ABCs of the Annual Wellness Visit  Subsequent Medicare Wellness Visit    Subjective    Krissy Castillo is a 78 y.o. female who presents for a Subsequent Medicare Wellness Visit.    The following portions of the patient's history were reviewed and   updated as appropriate: allergies, current medications, past family history, past medical history, past social history, past surgical history, and problem list.    Compared to one year ago, the patient feels her physical   health is the same.    Compared to one year ago, the patient feels her mental   health is the same.    Recent Hospitalizations:  She was not admitted to the hospital during the last year.       Current Medical Providers:  Patient Care Team:  Monica Tomlinson APRN as PCP - General (Nurse Practitioner)    Outpatient Medications Prior to Visit   Medication Sig Dispense Refill    Calcium Carb-Cholecalciferol (Caltrate 600+D3 Soft) 600-800 MG-UNIT chewable tablet       Cobalamin Combinations (Vitamin B12-Folic Acid) 500-400 MCG tablet       ibuprofen (ADVIL,MOTRIN) 600 MG tablet ibuprofen 600 mg oral tablet take 1 tablet (600 mg) by oral route 3 times per day with food   Active      meclizine (ANTIVERT) 12.5 MG tablet meclizine 12.5 mg oral tablet take 2 tablets (25 mg) by oral route 2 times per day as needed   Active      Multiple Vitamins-Minerals (ZINC PO) Take  by mouth.      atenolol (TENORMIN) 50 MG tablet Take 1 tablet by mouth 2 (Two) Times a Day. 180 tablet 1    glipizide (GLUCOTROL) 5 MG tablet Take 1 tablet by mouth 2 (Two) Times a Day Before Meals. 180 tablet 1    Jardiance 10 MG tablet tablet Take 1 tablet by mouth Daily. 90 tablet 1    levothyroxine (SYNTHROID, LEVOTHROID) 50 MCG tablet Take 1 tablet by mouth Every Morning. 90 tablet 1    losartan (COZAAR) 100 MG tablet Take 1 tablet by mouth Daily. 90 tablet 1    rizatriptan (MAXALT) 10 MG tablet Take 1 tablet by mouth 1 (One) Time As Needed for Migraine. May repeat in 2 hours if needed  "12 tablet 1    calcium polycarbophil (FiberCon) 625 MG tablet       cholecalciferol (VITAMIN D3) 25 MCG (1000 UT) tablet Take 1 tablet by mouth.       No facility-administered medications prior to visit.       No opioid medication identified on active medication list. I have reviewed chart for other potential  high risk medication/s and harmful drug interactions in the elderly.          Aspirin is on active medication list. Aspirin use is not indicated based on review of current medical condition/s. Risk of harm outweighs potential benefits. Patient instructed to discontinue this medication.  .      Patient Active Problem List   Diagnosis    Heel pain    Arthritis    Benign essential hypertension    Constipated    Diverticular disease of colon    Gastric ulcer    Glaucoma    Hyperlipidemia    Hypertension    Hypothyroidism    Migraine with aura    Osteoporosis    Spinal stenosis    Diabetes mellitus type II, controlled    Vitamin B12 deficiency    Vitamin D deficiency     Advance Care Planning   Advance Care Planning     Advance Directive is not on file.  ACP discussion was held with the patient during this visit. Patient has an advance directive (not in EMR), copy requested.     Objective    Vitals:    09/15/23 1129 09/15/23 1136   BP: 156/66 137/67   Pulse: 50    Temp: 96.6 °F (35.9 °C)    TempSrc: Temporal    SpO2: 97%    Weight: 56.6 kg (124 lb 12.8 oz)      Estimated body mass index is 23.58 kg/m² as calculated from the following:    Height as of 3/10/22: 154.9 cm (61\").    Weight as of this encounter: 56.6 kg (124 lb 12.8 oz).    BMI is >= 25 and <30. (Overweight) The following options were offered after discussion;: exercise counseling/recommendations and nutrition counseling/recommendations      Does the patient have evidence of cognitive impairment? No          HEALTH RISK ASSESSMENT    Smoking Status:  Social History     Tobacco Use   Smoking Status Never   Smokeless Tobacco Never     Alcohol " Consumption:  Social History     Substance and Sexual Activity   Alcohol Use Never     Fall Risk Screen:    KYLEADI Fall Risk Assessment was completed, and patient is at LOW risk for falls.Assessment completed on:9/15/2023    Depression Screenin/15/2023    11:34 AM   PHQ-2/PHQ-9 Depression Screening   Little Interest or Pleasure in Doing Things 0-->not at all   Feeling Down, Depressed or Hopeless 0-->not at all   PHQ-9: Brief Depression Severity Measure Score 0       Health Habits and Functional and Cognitive Screenin/15/2023    11:32 AM   Functional & Cognitive Status   Do you have difficulty preparing food and eating? No   Do you have difficulty bathing yourself, getting dressed or grooming yourself? No   Do you have difficulty using the toilet? No   Do you have difficulty moving around from place to place? No   Do you have trouble with steps or getting out of a bed or a chair? No   Current Diet Well Balanced Diet   Dental Exam Up to date   Eye Exam Up to date   Exercise (times per week) 7 times per week   Current Exercises Include Walking   Do you need help using the phone?  No   Are you deaf or do you have serious difficulty hearing?  No   Do you need help to go to places out of walking distance? No   Do you need help shopping? No   Do you need help preparing meals?  No   Do you need help with housework?  No   Do you need help with laundry? No   Do you need help taking your medications? No   Do you need help managing money? No   Do you ever drive or ride in a car without wearing a seat belt? No   Have you felt unusual stress, anger or loneliness in the last month? No   Who do you live with? Alone   If you need help, do you have trouble finding someone available to you? No   Have you been bothered in the last four weeks by sexual problems? No   Do you have difficulty concentrating, remembering or making decisions? No       Age-appropriate Screening Schedule:  Refer to the list below for future  "screening recommendations based on patient's age, sex and/or medical conditions. Orders for these recommended tests are listed in the plan section. The patient has been provided with a written plan.    Health Maintenance   Topic Date Due    ZOSTER VACCINE (1 of 2) Never done    TDAP/TD VACCINES (2 - Td or Tdap) 05/11/2019    DXA SCAN  11/08/2021    DIABETIC EYE EXAM  07/08/2022    COVID-19 Vaccine (2 - Booster for Roberta series) 07/15/2022    URINE MICROALBUMIN  09/12/2023    HEMOGLOBIN A1C  09/20/2023    INFLUENZA VACCINE  10/01/2023    LIPID PANEL  03/20/2024    ANNUAL WELLNESS VISIT  09/15/2024    HEPATITIS C SCREENING  Completed    Pneumococcal Vaccine 65+  Completed                  CMS Preventative Services Quick Reference  Risk Factors Identified During Encounter  Immunizations Discussed/Encouraged: Shingrix  The above risks/problems have been discussed with the patient.  Pertinent information has been shared with the patient in the After Visit Summary.  An After Visit Summary and PPPS were made available to the patient.    Follow Up:   Next Medicare Wellness visit to be scheduled in 1 year.       Additional E&M Note during same encounter follows:  Patient has multiple medical problems which are significant and separately identifiable that require additional work above and beyond the Medicare Wellness Visit.      Chief Complaint  Medicare Wellness-subsequent and Hypothyroidism (Follow up)    Subjective        HPI  Krissy IRENE CurryCastillo is also being seen today for     HTN - Blood pressure controlled in office today. Patient checks BP once a month at home.     Migraine - Has one every other month. States they have improved since she has retired.     Diabetes - Checks her sugars once a week at home. Her sugars have been well controlled, last sugar was 124. States she intermittently gets yeast like symptoms.     Hypothyroidism - Takes every morning on an empty stomach. Energy levels are \"pretty good\".     Review " of Systems   Constitutional:  Negative for fatigue.   Respiratory:  Negative for chest tightness and shortness of breath.    Cardiovascular:  Negative for chest pain and palpitations.   Gastrointestinal:  Negative for constipation, diarrhea, nausea and vomiting.   Endocrine: Positive for polyuria. Negative for polydipsia and polyphagia.   Genitourinary:  Negative for difficulty urinating and dysuria.   Musculoskeletal:  Negative for neck pain and neck stiffness.   Skin:  Negative for rash.   Neurological:  Positive for dizziness.   Psychiatric/Behavioral:  Negative for suicidal ideas.      Objective   Vital Signs:  /67   Pulse 50   Temp 96.6 °F (35.9 °C) (Temporal)   Wt 56.6 kg (124 lb 12.8 oz)   SpO2 97%   BMI 23.58 kg/m²     Physical Exam  Vitals reviewed.   Constitutional:       Appearance: Normal appearance. She is well-developed.   HENT:      Head: Normocephalic and atraumatic.   Eyes:      Conjunctiva/sclera: Conjunctivae normal.      Pupils: Pupils are equal, round, and reactive to light.   Neck:      Thyroid: No thyroid mass, thyromegaly or thyroid tenderness.      Vascular: No carotid bruit.   Cardiovascular:      Rate and Rhythm: Normal rate and regular rhythm.      Heart sounds: Normal heart sounds. No murmur heard.  Pulmonary:      Effort: Pulmonary effort is normal.      Breath sounds: Normal breath sounds. No wheezing or rhonchi.   Abdominal:      General: Bowel sounds are normal. There is no distension.      Palpations: Abdomen is soft.      Tenderness: There is no abdominal tenderness.   Skin:     General: Skin is warm and dry.   Neurological:      Mental Status: She is alert and oriented to person, place, and time.   Psychiatric:         Mood and Affect: Mood and affect normal.         Behavior: Behavior normal.         Thought Content: Thought content normal.         Judgment: Judgment normal.        The following data was reviewed by: LORENA Darden on 09/15/2023:  Common labs           3/20/2023    11:41   Common Labs   Glucose 128    BUN 20    Creatinine 0.92    Sodium 139    Potassium 5.1    Chloride 100    Calcium 11.2    Albumin 4.6    Total Bilirubin 0.7    Alkaline Phosphatase 81    AST (SGOT) 16    ALT (SGPT) 13    Total Cholesterol 216    Triglycerides 78    HDL Cholesterol 60    LDL Cholesterol  142    Hemoglobin A1C 6.10             Assessment and Plan   Diagnoses and all orders for this visit:    1. Medicare annual wellness visit, subsequent (Primary)    2. Mixed hyperlipidemia    3. Primary hypertension    4. Hypothyroidism, unspecified type  -     TSH; Future    5. Essential hypertension  -     atenolol (TENORMIN) 50 MG tablet; Take 1 tablet by mouth 2 (Two) Times a Day.  Dispense: 180 tablet; Refill: 1  -     losartan (COZAAR) 100 MG tablet; Take 1 tablet by mouth Daily.  Dispense: 90 tablet; Refill: 1    6. Controlled type 2 diabetes mellitus without complication, with long-term current use of insulin  -     glipizide (GLUCOTROL) 5 MG tablet; Take 1 tablet by mouth 2 (Two) Times a Day Before Meals.  Dispense: 180 tablet; Refill: 1  -     Jardiance 10 MG tablet tablet; Take 1 tablet by mouth Daily.  Dispense: 90 tablet; Refill: 1  -     Comprehensive Metabolic Panel; Future  -     Lipid Panel; Future  -     Hemoglobin A1c; Future  -     Microalbumin / Creatinine Urine Ratio - Urine, Clean Catch; Future    7. Hypothyroidism due to Hashimoto's thyroiditis  -     levothyroxine (SYNTHROID, LEVOTHROID) 50 MCG tablet; Take 1 tablet by mouth Every Morning.  Dispense: 90 tablet; Refill: 1    8. Intractable migraine with aura without status migrainosus  -     rizatriptan (MAXALT) 10 MG tablet; Take 1 tablet by mouth 1 (One) Time As Needed for Migraine. May repeat in 2 hours if needed  Dispense: 12 tablet; Refill: 1         Follow Up   Return in about 6 months (around 3/15/2024) for Next scheduled follow up.  Patient was given instructions and counseling regarding her condition or for  health maintenance advice. Please see specific information pulled into the AVS if appropriate.

## 2024-03-23 DIAGNOSIS — I10 ESSENTIAL HYPERTENSION: ICD-10-CM

## 2024-03-23 DIAGNOSIS — E03.8 HYPOTHYROIDISM DUE TO HASHIMOTO'S THYROIDITIS: ICD-10-CM

## 2024-03-23 DIAGNOSIS — Z79.4 CONTROLLED TYPE 2 DIABETES MELLITUS WITHOUT COMPLICATION, WITH LONG-TERM CURRENT USE OF INSULIN: ICD-10-CM

## 2024-03-23 DIAGNOSIS — E11.9 CONTROLLED TYPE 2 DIABETES MELLITUS WITHOUT COMPLICATION, WITH LONG-TERM CURRENT USE OF INSULIN: ICD-10-CM

## 2024-03-23 DIAGNOSIS — E06.3 HYPOTHYROIDISM DUE TO HASHIMOTO'S THYROIDITIS: ICD-10-CM

## 2024-03-25 RX ORDER — EMPAGLIFLOZIN 10 MG/1
10 TABLET, FILM COATED ORAL DAILY
Qty: 90 TABLET | Refills: 1 | OUTPATIENT
Start: 2024-03-25

## 2024-03-25 RX ORDER — GLIPIZIDE 5 MG/1
5 TABLET ORAL
Qty: 180 TABLET | Refills: 1 | OUTPATIENT
Start: 2024-03-25

## 2024-03-25 RX ORDER — ATENOLOL 50 MG/1
50 TABLET ORAL 2 TIMES DAILY
Qty: 180 TABLET | Refills: 1 | OUTPATIENT
Start: 2024-03-25

## 2024-03-25 RX ORDER — LOSARTAN POTASSIUM 100 MG/1
100 TABLET ORAL DAILY
Qty: 90 TABLET | Refills: 1 | OUTPATIENT
Start: 2024-03-25

## 2024-03-25 RX ORDER — LEVOTHYROXINE SODIUM 0.05 MG/1
50 TABLET ORAL
Qty: 90 TABLET | Refills: 1 | OUTPATIENT
Start: 2024-03-25